# Patient Record
Sex: MALE | Race: WHITE | NOT HISPANIC OR LATINO | Employment: OTHER | URBAN - METROPOLITAN AREA
[De-identification: names, ages, dates, MRNs, and addresses within clinical notes are randomized per-mention and may not be internally consistent; named-entity substitution may affect disease eponyms.]

---

## 2022-03-07 ENCOUNTER — TELEPHONE (OUTPATIENT)
Dept: UROLOGY | Facility: CLINIC | Age: 82
End: 2022-03-07
Payer: MEDICARE

## 2022-03-07 NOTE — TELEPHONE ENCOUNTER
Called and spoke with pt, pt wanting to know why his appointment was cancelled, informed pt that his appointment was cancelled due to Dr Jha being out on vacation, pt states Michael Swain was suppose to send over a referral to Dr Sousa office which we never received, pt states he is coming for a yearly check up but could not tell me for what. Pt requesting we send for his records, informed pt that we will need him to sign a release of information first, pt upset that we can just call and request his records. Advised pt that he is more than welcome to call and have Michael Swain's office fax them over or he can go pick them up himself if he would like to. Pt scheduled for 3/22/22 at 10:00am gianni Quintana.

## 2022-03-07 NOTE — TELEPHONE ENCOUNTER
----- Message from Mima Duff sent at 3/7/2022  7:10 AM CST -----  Contact: Pt Mobile [phone]  Patient would like a call back in regards to him wanting to know why his appointment with you on today was cancelled please?

## 2022-03-22 ENCOUNTER — OFFICE VISIT (OUTPATIENT)
Dept: UROLOGY | Facility: CLINIC | Age: 82
End: 2022-03-22
Payer: MEDICARE

## 2022-03-22 VITALS
DIASTOLIC BLOOD PRESSURE: 80 MMHG | SYSTOLIC BLOOD PRESSURE: 130 MMHG | WEIGHT: 190.06 LBS | BODY MASS INDEX: 27.27 KG/M2 | HEART RATE: 67 BPM

## 2022-03-22 DIAGNOSIS — R35.1 BPH ASSOCIATED WITH NOCTURIA: Primary | ICD-10-CM

## 2022-03-22 DIAGNOSIS — N40.1 BPH ASSOCIATED WITH NOCTURIA: Primary | ICD-10-CM

## 2022-03-22 PROCEDURE — 99999 PR PBB SHADOW E&M-EST. PATIENT-LVL III: ICD-10-PCS | Mod: PBBFAC,,, | Performed by: UROLOGY

## 2022-03-22 PROCEDURE — 99213 OFFICE O/P EST LOW 20 MIN: CPT | Mod: PBBFAC,PO | Performed by: UROLOGY

## 2022-03-22 PROCEDURE — 99203 OFFICE O/P NEW LOW 30 MIN: CPT | Mod: S$PBB,,, | Performed by: UROLOGY

## 2022-03-22 PROCEDURE — 99999 PR PBB SHADOW E&M-EST. PATIENT-LVL III: CPT | Mod: PBBFAC,,, | Performed by: UROLOGY

## 2022-03-22 PROCEDURE — 99203 PR OFFICE/OUTPT VISIT, NEW, LEVL III, 30-44 MIN: ICD-10-PCS | Mod: S$PBB,,, | Performed by: UROLOGY

## 2022-03-22 RX ORDER — DOFETILIDE 0.5 MG/1
125 CAPSULE ORAL EVERY 12 HOURS
COMMUNITY

## 2022-03-22 RX ORDER — ASPIRIN 81 MG/1
81 TABLET ORAL
COMMUNITY

## 2022-03-22 RX ORDER — ACETAMINOPHEN 500 MG
1 TABLET ORAL DAILY
COMMUNITY

## 2022-03-22 RX ORDER — ATORVASTATIN CALCIUM 20 MG/1
20 TABLET, FILM COATED ORAL DAILY
COMMUNITY

## 2022-03-22 NOTE — PROGRESS NOTES
Chief Complaint   Patient presents with    Other     Frequency during nigh time. Pt saw previous urologist. Pt psa from jan was 1.8        Referring Provider: Arianna Swain     History of Present Illness:   Corey Holland is a 81 y.o. male here for evaluation of Other (Frequency during nigh time. Pt saw previous urologist. Pt psa from jan was 1.8 )    3/22/22- 80yo male referred for BPH checkup. Nocturia x 2. Not interested in medication. He previously saw Dr. El and trace msh was seen once, but didn't require workup at that time. Cytology was negative. Records reviewed. Stream is good. No gross hematuria or dysuria.   Pt is a cyclist. Biked 80 miles 10 days in a row for his 81th birthday.         Review of Systems   Constitutional: Negative for chills and fever.   Respiratory: Negative for shortness of breath.    Cardiovascular: Negative for chest pain.   Gastrointestinal: Negative for abdominal pain.   Genitourinary: Positive for nocturia.   All other systems reviewed and are negative.      Past Medical History:   Diagnosis Date    Arthritis     Essential (primary) hypertension     Hypothyroidism, unspecified     Mixed hyperlipidemia     Paroxysmal atrial fibrillation        Past Surgical History:   Procedure Laterality Date    ANKLE SURGERY         No family history on file.    Social History     Tobacco Use    Smoking status: Never Smoker   Substance Use Topics    Alcohol use: Yes     Alcohol/week: 6.0 standard drinks     Types: 6 Cans of beer per week       Current Outpatient Medications   Medication Sig Dispense Refill    atorvastatin (LIPITOR) 20 MG tablet Take 20 mg by mouth once daily.      betahistine HCl (BETAHISTINE, BULK, MISC) 24 mg by Misc.(Non-Drug; Combo Route) route.      cholecalciferol, vitamin D3, (VITAMIN D3) 50 mcg (2,000 unit) Cap Take 1 capsule by mouth once daily.      dofetilide (TIKOSYN) 500 MCG Cap Take 125 mcg by mouth every 12 (twelve) hours.      levothyroxine  (SYNTHROID) 137 MCG Tab Take by mouth before breakfast.      meloxicam (MOBIC) 15 MG tablet Take 15 mg by mouth daily as needed.      aspirin (ECOTRIN) 81 MG EC tablet Take 81 mg by mouth.       No current facility-administered medications for this visit.       Review of patient's allergies indicates:   Allergen Reactions    Penicillins        Physical Exam  Vitals:    03/22/22 1113   BP: 130/80   Pulse: 67       General: Well-developed, well-nourished in no acute distress  HEENT: Normocephalic, atraumatic, Extraocular movements intact  Neck: supple, trachea midline, no cervical or supraclavicular lymphadenopathy  Respirations: even and unlabored  Back: midline spine, no CVA tenderness  Rectal: 40g prostate, no nodules or tenderness. No gross blood  Extremities: atraumatic, moves all equally, no clubbing, cyanosis or edema  Psych: normal affect  Skin: warm and dry, no lesions  Neuro: Alert and oriented, Cranial nerves II-XII grossly intact      PSA  1/25/22- 1.8    Assessment:   1. BPH associated with nocturia  POCT URINE DIPSTICK WITHOUT MICROSCOPE       Plan:  BPH associated with nocturia  -     POCT URINE DIPSTICK WITHOUT MICROSCOPE        Follow up in about 1 year (around 3/22/2023).

## 2022-03-30 PROBLEM — R35.1 BPH ASSOCIATED WITH NOCTURIA: Status: ACTIVE | Noted: 2022-03-30

## 2022-03-30 PROBLEM — N40.1 BPH ASSOCIATED WITH NOCTURIA: Status: ACTIVE | Noted: 2022-03-30

## 2024-07-08 ENCOUNTER — OFFICE VISIT (OUTPATIENT)
Dept: UROLOGY | Facility: CLINIC | Age: 84
End: 2024-07-08
Payer: MEDICARE

## 2024-07-08 VITALS
SYSTOLIC BLOOD PRESSURE: 139 MMHG | HEIGHT: 70 IN | DIASTOLIC BLOOD PRESSURE: 77 MMHG | RESPIRATION RATE: 18 BRPM | WEIGHT: 186.06 LBS | BODY MASS INDEX: 26.64 KG/M2 | HEART RATE: 45 BPM

## 2024-07-08 DIAGNOSIS — N13.8 BPH WITH OBSTRUCTION/LOWER URINARY TRACT SYMPTOMS: Primary | ICD-10-CM

## 2024-07-08 DIAGNOSIS — R35.1 NOCTURIA: ICD-10-CM

## 2024-07-08 DIAGNOSIS — I48.0 PAROXYSMAL ATRIAL FIBRILLATION: ICD-10-CM

## 2024-07-08 DIAGNOSIS — N40.1 BPH WITH OBSTRUCTION/LOWER URINARY TRACT SYMPTOMS: Primary | ICD-10-CM

## 2024-07-08 PROCEDURE — 99214 OFFICE O/P EST MOD 30 MIN: CPT | Mod: S$GLB,,, | Performed by: UROLOGY

## 2024-07-08 PROCEDURE — 1101F PT FALLS ASSESS-DOCD LE1/YR: CPT | Mod: CPTII,S$GLB,, | Performed by: UROLOGY

## 2024-07-08 PROCEDURE — 3075F SYST BP GE 130 - 139MM HG: CPT | Mod: CPTII,S$GLB,, | Performed by: UROLOGY

## 2024-07-08 PROCEDURE — 99999 PR PBB SHADOW E&M-EST. PATIENT-LVL III: CPT | Mod: PBBFAC,,, | Performed by: UROLOGY

## 2024-07-08 PROCEDURE — 1126F AMNT PAIN NOTED NONE PRSNT: CPT | Mod: CPTII,S$GLB,, | Performed by: UROLOGY

## 2024-07-08 PROCEDURE — 1159F MED LIST DOCD IN RCRD: CPT | Mod: CPTII,S$GLB,, | Performed by: UROLOGY

## 2024-07-08 PROCEDURE — 3288F FALL RISK ASSESSMENT DOCD: CPT | Mod: CPTII,S$GLB,, | Performed by: UROLOGY

## 2024-07-08 PROCEDURE — 3078F DIAST BP <80 MM HG: CPT | Mod: CPTII,S$GLB,, | Performed by: UROLOGY

## 2024-07-08 RX ORDER — TAMSULOSIN HYDROCHLORIDE 0.4 MG/1
0.4 CAPSULE ORAL NIGHTLY
Qty: 30 CAPSULE | Refills: 11 | Status: SHIPPED | OUTPATIENT
Start: 2024-07-08 | End: 2025-07-08

## 2024-07-08 RX ORDER — LOSARTAN POTASSIUM 25 MG/1
25 TABLET ORAL
COMMUNITY

## 2024-07-08 RX ORDER — RIVAROXABAN 20 MG/1
TABLET, FILM COATED ORAL
COMMUNITY
Start: 2024-06-12

## 2024-07-08 RX ORDER — CLOBETASOL PROPIONATE 0.5 MG/G
CREAM TOPICAL
COMMUNITY
Start: 2024-05-03 | End: 2024-10-30

## 2024-07-08 NOTE — PROGRESS NOTES
CC: BPH    History of Present Illness:   Corey Holland is a 83 y.o. male here for follow up.     7/8/24-Pt developed A-fib and is now on Xarelto. Pt continues to cycle. LUTS are becoming more bothersome. He is having nocturia x 3. Denies weak stream. He also has some daytime frequency. No gross hematuria.   3/22/22- 80yo male referred for BPH checkup. Nocturia x 2. Not interested in medication. He previously saw Dr. El and trace msh was seen once, but didn't require workup at that time. Cytology was negative. Records reviewed. Stream is good. No gross hematuria or dysuria.   Pt is a cyclist. Biked 80 miles 10 days in a row for his 81th birthday.         Review of Systems   Constitutional:  Negative for chills and fever.   Respiratory:  Negative for shortness of breath.    Cardiovascular:  Negative for chest pain.   Gastrointestinal:  Negative for abdominal pain.   Genitourinary:  Positive for nocturia.   All other systems reviewed and are negative.      Past Medical History:   Diagnosis Date    Arthritis     Essential (primary) hypertension     Hypothyroidism, unspecified     Mixed hyperlipidemia     Paroxysmal atrial fibrillation        Past Surgical History:   Procedure Laterality Date    ANKLE SURGERY         No family history on file.    Social History     Tobacco Use    Smoking status: Never   Substance Use Topics    Alcohol use: Yes     Alcohol/week: 6.0 standard drinks of alcohol     Types: 6 Cans of beer per week       Current Outpatient Medications   Medication Sig Dispense Refill    aspirin (ECOTRIN) 81 MG EC tablet Take 81 mg by mouth.      atorvastatin (LIPITOR) 20 MG tablet Take 20 mg by mouth once daily.      betahistine HCl (BETAHISTINE, BULK, MISC) 24 mg by Misc.(Non-Drug; Combo Route) route.      cholecalciferol, vitamin D3, (VITAMIN D3) 50 mcg (2,000 unit) Cap Take 1 capsule by mouth once daily.      clobetasoL (TEMOVATE) 0.05 % cream Apply topically.      dofetilide (TIKOSYN) 500 MCG Cap Take  125 mcg by mouth every 12 (twelve) hours.      levothyroxine (SYNTHROID) 137 MCG Tab Take by mouth before breakfast.      meloxicam (MOBIC) 15 MG tablet Take 15 mg by mouth daily as needed.      XARELTO 20 mg Tab TAKE 1 TABLET BY MOUTH EVERY DAY WITH THE EVENING MEAL      losartan (COZAAR) 25 MG tablet Take 25 mg by mouth.      tamsulosin (FLOMAX) 0.4 mg Cap Take 1 capsule (0.4 mg total) by mouth every evening. 30 capsule 11     No current facility-administered medications for this visit.       Review of patient's allergies indicates:   Allergen Reactions    Penicillins        Physical Exam  Vitals:    07/08/24 1217   BP: 139/77   Pulse: (!) 45   Resp: 18       General: Well-developed, well-nourished in no acute distress  HEENT: Normocephalic, atraumatic, Extraocular movements intact  Neck: supple, trachea midline, no cervical or supraclavicular lymphadenopathy  Respirations: even and unlabored  Back: midline spine, no CVA tenderness  Rectal: 7/8/24-40g prostate, no nodules or tenderness. No gross blood  Extremities: atraumatic, moves all equally, no clubbing, cyanosis or edema  Psych: normal affect  Skin: warm and dry, no lesions  Neuro: Alert and oriented, Cranial nerves II-XII grossly intact      PSA  1/25/22- 1.8  5/3/24: 2.4    Assessment:   1. BPH with obstruction/lower urinary tract symptoms        2. Nocturia        3. Paroxysmal atrial fibrillation              Plan:  BPH with obstruction/lower urinary tract symptoms    Nocturia    Paroxysmal atrial fibrillation    Other orders  -     tamsulosin (FLOMAX) 0.4 mg Cap; Take 1 capsule (0.4 mg total) by mouth every evening.  Dispense: 30 capsule; Refill: 11      Limit pm fluid    Follow up in about 3 months (around 10/8/2024).

## 2024-10-07 ENCOUNTER — OFFICE VISIT (OUTPATIENT)
Dept: UROLOGY | Facility: CLINIC | Age: 84
End: 2024-10-07
Payer: MEDICARE

## 2024-10-07 VITALS
HEART RATE: 52 BPM | TEMPERATURE: 98 F | BODY MASS INDEX: 27.08 KG/M2 | HEIGHT: 70 IN | WEIGHT: 189.13 LBS | RESPIRATION RATE: 17 BRPM | SYSTOLIC BLOOD PRESSURE: 136 MMHG | DIASTOLIC BLOOD PRESSURE: 84 MMHG

## 2024-10-07 DIAGNOSIS — N40.1 BPH WITH OBSTRUCTION/LOWER URINARY TRACT SYMPTOMS: Primary | ICD-10-CM

## 2024-10-07 DIAGNOSIS — R31.29 MICROHEMATURIA: ICD-10-CM

## 2024-10-07 DIAGNOSIS — R35.1 NOCTURIA: ICD-10-CM

## 2024-10-07 DIAGNOSIS — N13.8 BPH WITH OBSTRUCTION/LOWER URINARY TRACT SYMPTOMS: Primary | ICD-10-CM

## 2024-10-07 LAB
BACTERIA #/AREA URNS HPF: NORMAL /HPF
BILIRUB UR QL STRIP: NEGATIVE
GLUCOSE UR QL STRIP: NEGATIVE
KETONES UR QL STRIP: NEGATIVE
LEUKOCYTE ESTERASE UR QL STRIP: NEGATIVE
MICROSCOPIC COMMENT: NORMAL
PH, POC UA: 5.5
POC BLOOD, URINE: POSITIVE
POC NITRATES, URINE: NEGATIVE
PROT UR QL STRIP: NEGATIVE
RBC #/AREA URNS HPF: 2 /HPF (ref 0–4)
SP GR UR STRIP: 1.03 (ref 1–1.03)
UROBILINOGEN UR STRIP-ACNC: 0.2 (ref 0.3–2.2)
WBC #/AREA URNS HPF: 2 /HPF (ref 0–5)

## 2024-10-07 PROCEDURE — 1126F AMNT PAIN NOTED NONE PRSNT: CPT | Mod: CPTII,S$GLB,, | Performed by: UROLOGY

## 2024-10-07 PROCEDURE — 81000 URINALYSIS NONAUTO W/SCOPE: CPT | Performed by: UROLOGY

## 2024-10-07 PROCEDURE — 99999 PR PBB SHADOW E&M-EST. PATIENT-LVL IV: CPT | Mod: PBBFAC,,, | Performed by: UROLOGY

## 2024-10-07 PROCEDURE — 99214 OFFICE O/P EST MOD 30 MIN: CPT | Mod: S$GLB,,, | Performed by: UROLOGY

## 2024-10-07 PROCEDURE — 1159F MED LIST DOCD IN RCRD: CPT | Mod: CPTII,S$GLB,, | Performed by: UROLOGY

## 2024-10-07 PROCEDURE — 81003 URINALYSIS AUTO W/O SCOPE: CPT | Mod: QW,S$GLB,, | Performed by: UROLOGY

## 2024-10-07 PROCEDURE — 3079F DIAST BP 80-89 MM HG: CPT | Mod: CPTII,S$GLB,, | Performed by: UROLOGY

## 2024-10-07 PROCEDURE — 1101F PT FALLS ASSESS-DOCD LE1/YR: CPT | Mod: CPTII,S$GLB,, | Performed by: UROLOGY

## 2024-10-07 PROCEDURE — 3075F SYST BP GE 130 - 139MM HG: CPT | Mod: CPTII,S$GLB,, | Performed by: UROLOGY

## 2024-10-07 PROCEDURE — 3288F FALL RISK ASSESSMENT DOCD: CPT | Mod: CPTII,S$GLB,, | Performed by: UROLOGY

## 2024-10-07 NOTE — PROGRESS NOTES
CC: BPH    History of Present Illness:   Corey Holland is a 84 y.o. male here for follow up.     10/7/24-didn't notice a dramatic difference in his urination with the flomax. He has had improvement with stream and emptying better. Nocturia x 2-3. IPSS 6.   7/8/24-Pt developed A-fib and is now on Xarelto. Pt continues to cycle. LUTS are becoming more bothersome. He is having nocturia x 3. Denies weak stream. He also has some daytime frequency. No gross hematuria.   3/22/22- 80yo male referred for BPH checkup. Nocturia x 2. Not interested in medication. He previously saw Dr. El and trace msh was seen once, but didn't require workup at that time. Cytology was negative. Records reviewed. Stream is good. No gross hematuria or dysuria.   Pt is a cyclist. Biked 80 miles 10 days in a row for his 81th birthday.         Review of Systems   Constitutional:  Negative for chills and fever.   Respiratory:  Negative for shortness of breath.    Cardiovascular:  Negative for chest pain.   Gastrointestinal:  Negative for abdominal pain.   Genitourinary:  Positive for nocturia.   All other systems reviewed and are negative.      Past Medical History:   Diagnosis Date    Arthritis     Essential (primary) hypertension     Hypothyroidism, unspecified     Mixed hyperlipidemia     Paroxysmal atrial fibrillation        Past Surgical History:   Procedure Laterality Date    ANKLE SURGERY         No family history on file.    Social History     Tobacco Use    Smoking status: Never   Substance Use Topics    Alcohol use: Yes     Alcohol/week: 6.0 standard drinks of alcohol     Types: 6 Cans of beer per week       Current Outpatient Medications   Medication Sig Dispense Refill    aspirin (ECOTRIN) 81 MG EC tablet Take 81 mg by mouth.      atorvastatin (LIPITOR) 20 MG tablet Take 20 mg by mouth once daily.      betahistine HCl (BETAHISTINE, BULK, MISC) 24 mg by Misc.(Non-Drug; Combo Route) route.      cholecalciferol, vitamin D3, (VITAMIN D3)  50 mcg (2,000 unit) Cap Take 1 capsule by mouth once daily.      clobetasoL (TEMOVATE) 0.05 % cream Apply topically.      dofetilide (TIKOSYN) 500 MCG Cap Take 125 mcg by mouth every 12 (twelve) hours.      levothyroxine (SYNTHROID) 137 MCG Tab Take by mouth before breakfast.      losartan (COZAAR) 25 MG tablet Take 25 mg by mouth. (Patient not taking: Reported on 10/7/2024)      meloxicam (MOBIC) 15 MG tablet Take 15 mg by mouth daily as needed.      tamsulosin (FLOMAX) 0.4 mg Cap Take 1 capsule (0.4 mg total) by mouth every evening. 30 capsule 11    XARELTO 20 mg Tab TAKE 1 TABLET BY MOUTH EVERY DAY WITH THE EVENING MEAL       No current facility-administered medications for this visit.       Review of patient's allergies indicates:   Allergen Reactions    Penicillins        Physical Exam  Vitals:    10/07/24 1221   BP: 136/84   Pulse: (!) 52   Resp: 17   Temp: 97.5 °F (36.4 °C)       General: Well-developed, well-nourished in no acute distress  HEENT: Normocephalic, atraumatic, Extraocular movements intact  Neck: supple, trachea midline, no cervical or supraclavicular lymphadenopathy  Respirations: even and unlabored  Back: midline spine, no CVA tenderness  Rectal: 7/8/24-40g prostate, no nodules or tenderness. No gross blood  Extremities: atraumatic, moves all equally, no clubbing, cyanosis or edema  Psych: normal affect  Skin: warm and dry, no lesions  Neuro: Alert and oriented, Cranial nerves II-XII grossly intact      PSA  1/25/22- 1.8  5/3/24: 2.4    Assessment:   1. BPH with obstruction/lower urinary tract symptoms  POCT Urinalysis, Dipstick, Automated, W/O Scope            Plan:  BPH with obstruction/lower urinary tract symptoms  -     POCT Urinalysis, Dipstick, Automated, W/O Scope      Limit pm fluid  Continue flomax  Follow up in about 9 months (around 7/7/2025).                       fluid  Continue flomax  Follow up in about 9 months (around 7/7/2025).

## 2025-04-22 ENCOUNTER — TELEPHONE (OUTPATIENT)
Dept: UROLOGY | Facility: CLINIC | Age: 85
End: 2025-04-22
Payer: MEDICARE

## 2025-04-22 NOTE — TELEPHONE ENCOUNTER
Outgoing call placed to patient, patient verified name and date of birth, patient notified of Dr. Jha being out of office on his next scheduled visit and the need to reschedule, he verbalized understanding and appt rescheduled as requested.

## 2025-04-22 NOTE — TELEPHONE ENCOUNTER
Outgoing call attempted to notify patient regarding his upcoming appointment but no answer, left voice message with contact information letting patient know he can contact us at his earliest convenience for assistance with rescheduling.

## 2025-05-09 ENCOUNTER — TELEPHONE (OUTPATIENT)
Dept: UROLOGY | Facility: CLINIC | Age: 85
End: 2025-05-09
Payer: MEDICARE

## 2025-05-16 ENCOUNTER — PATIENT MESSAGE (OUTPATIENT)
Dept: UROLOGY | Facility: CLINIC | Age: 85
End: 2025-05-16

## 2025-05-16 ENCOUNTER — PROCEDURE VISIT (OUTPATIENT)
Dept: UROLOGY | Facility: CLINIC | Age: 85
End: 2025-05-16
Payer: MEDICARE

## 2025-05-16 DIAGNOSIS — N40.2 PROSTATE NODULE: ICD-10-CM

## 2025-05-16 DIAGNOSIS — D49.4 BLADDER TUMOR: Primary | ICD-10-CM

## 2025-05-16 DIAGNOSIS — R31.0 GROSS HEMATURIA: ICD-10-CM

## 2025-05-16 DIAGNOSIS — N39.0 URINARY TRACT INFECTION WITHOUT HEMATURIA, SITE UNSPECIFIED: ICD-10-CM

## 2025-05-16 DIAGNOSIS — Z01.818 PRE-OP TESTING: ICD-10-CM

## 2025-05-16 RX ORDER — CIPROFLOXACIN 2 MG/ML
400 INJECTION, SOLUTION INTRAVENOUS
OUTPATIENT
Start: 2025-05-16

## 2025-05-16 RX ORDER — LIDOCAINE HYDROCHLORIDE 20 MG/ML
JELLY TOPICAL
Status: COMPLETED | OUTPATIENT
Start: 2025-05-16 | End: 2025-05-16

## 2025-05-16 RX ADMIN — LIDOCAINE HYDROCHLORIDE 11 ML: 20 JELLY TOPICAL at 10:05
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
## 2025-05-19 ENCOUNTER — HOSPITAL ENCOUNTER (OUTPATIENT)
Dept: CARDIOLOGY | Facility: HOSPITAL | Age: 85
Discharge: HOME | End: 2025-05-19
Attending: UROLOGY
Payer: MEDICARE

## 2025-05-19 ENCOUNTER — HOSPITAL ENCOUNTER (OUTPATIENT)
Dept: RADIOLOGY | Facility: HOSPITAL | Age: 85
Discharge: HOME | End: 2025-05-19
Attending: UROLOGY
Payer: MEDICARE

## 2025-05-19 DIAGNOSIS — N40.2 PROSTATE NODULE: ICD-10-CM

## 2025-05-19 LAB
OHS QRS DURATION: 100 MS
OHS QTC CALCULATION: 355 MS

## 2025-05-19 PROCEDURE — 71046 X-RAY EXAM CHEST 2 VIEWS: CPT | Mod: 26,,, | Performed by: RADIOLOGY

## 2025-05-19 PROCEDURE — 71046 X-RAY EXAM CHEST 2 VIEWS: CPT | Mod: TC

## 2025-05-19 PROCEDURE — 93005 ELECTROCARDIOGRAM TRACING: CPT

## 2025-05-19 PROCEDURE — 93010 ELECTROCARDIOGRAM REPORT: CPT | Mod: ,,, | Performed by: INTERNAL MEDICINE

## 2025-05-19 RX ORDER — TAMSULOSIN HYDROCHLORIDE 0.4 MG/1
0.4 CAPSULE ORAL
Qty: 30 CAPSULE | Refills: 11 | Status: SHIPPED | OUTPATIENT
Start: 2025-05-19

## 2025-05-23 ENCOUNTER — PATIENT MESSAGE (OUTPATIENT)
Dept: UROLOGY | Facility: CLINIC | Age: 85
End: 2025-05-23
Payer: MEDICARE

## 2025-05-26 ENCOUNTER — RESULTS FOLLOW-UP (OUTPATIENT)
Dept: UROLOGY | Facility: CLINIC | Age: 85
End: 2025-05-26

## 2025-05-26 ENCOUNTER — HOSPITAL ENCOUNTER (OUTPATIENT)
Dept: RADIOLOGY | Facility: HOSPITAL | Age: 85
Discharge: HOME | End: 2025-05-26
Attending: UROLOGY
Payer: MEDICARE

## 2025-05-26 DIAGNOSIS — N40.2 PROSTATE NODULE: ICD-10-CM

## 2025-05-26 PROCEDURE — 72197 MRI PELVIS W/O & W/DYE: CPT | Mod: 26,,, | Performed by: RADIOLOGY

## 2025-05-26 PROCEDURE — 72197 MRI PELVIS W/O & W/DYE: CPT | Mod: TC,PN

## 2025-05-26 PROCEDURE — 25500020 PHARM REV CODE 255: Mod: PN | Performed by: UROLOGY

## 2025-05-26 PROCEDURE — A9585 GADOBUTROL INJECTION: HCPCS | Mod: PN | Performed by: UROLOGY

## 2025-05-26 RX ORDER — GADOBUTROL 604.72 MG/ML
8 INJECTION INTRAVENOUS
Status: COMPLETED | OUTPATIENT
Start: 2025-05-26 | End: 2025-05-26

## 2025-05-26 RX ADMIN — GADOBUTROL 8 ML: 604.72 INJECTION INTRAVENOUS at 10:05

## 2025-05-27 ENCOUNTER — OFFICE VISIT (OUTPATIENT)
Dept: INTERNAL MEDICINE | Facility: CLINIC | Age: 85
End: 2025-05-27
Payer: MEDICARE

## 2025-05-27 VITALS
HEART RATE: 73 BPM | TEMPERATURE: 99 F | RESPIRATION RATE: 20 BRPM | OXYGEN SATURATION: 97 % | DIASTOLIC BLOOD PRESSURE: 80 MMHG | SYSTOLIC BLOOD PRESSURE: 130 MMHG

## 2025-05-27 DIAGNOSIS — Z01.818 PREOPERATIVE EXAMINATION: Primary | ICD-10-CM

## 2025-05-27 DIAGNOSIS — I48.0 PAF (PAROXYSMAL ATRIAL FIBRILLATION): ICD-10-CM

## 2025-05-27 DIAGNOSIS — N40.2 PROSTATE NODULE: ICD-10-CM

## 2025-05-27 PROCEDURE — 99999 PR PBB SHADOW E&M-EST. PATIENT-LVL III: CPT | Mod: PBBFAC,,,

## 2025-05-27 NOTE — PROGRESS NOTES
Preoperative History and Physical                                                             Hospital Medicine      Chief Complaint: Preoperative consultation    History of Present Illness:      Corey Lino is a 84 y.o. male who  has a past medical history of Arthritis, Essential (primary) hypertension, Hypothyroidism, unspecified, Mixed hyperlipidemia, BPH, and Paroxysmal atrial fibrillation s/p ablation who presents to the office today for a preoperative consultation at the request of Dr. Meghan Jha who plans on performing TURBT on May 29, 2025.    Functional Status:      The patient is able to climb a flight of stairs. The patient is able to ambulate without difficulty. The patient's functional status is not affected by their joint pain. The patient's functional status is not affected by shortness of breath, chest pain, dyspnea on exertion and fatigue.      Past Medical History:      Past Medical History:   Diagnosis Date    Arthritis     Essential (primary) hypertension     Hypothyroidism, unspecified     Mixed hyperlipidemia     Paroxysmal atrial fibrillation         Past Surgical History:      Past Surgical History:   Procedure Laterality Date    ANKLE SURGERY      cardiac loop monitor          Social History:      Social History     Socioeconomic History    Marital status:    Tobacco Use    Smoking status: Never   Substance and Sexual Activity    Alcohol use: Yes     Alcohol/week: 6.0 standard drinks of alcohol     Types: 6 Cans of beer per week     Social Drivers of Health     Financial Resource Strain: Low Risk  (5/11/2025)    Overall Financial Resource Strain (CARDIA)     Difficulty of Paying Living Expenses: Not hard at all   Food Insecurity: No Food Insecurity (5/11/2025)    Hunger Vital Sign     Worried About Running Out of Food in the Last Year: Never true     Ran Out of Food in the Last Year: Never true   Transportation Needs: No Transportation Needs (5/11/2025)    PRAPARE -  Transportation     Lack of Transportation (Medical): No     Lack of Transportation (Non-Medical): No   Physical Activity: Sufficiently Active (5/11/2025)    Exercise Vital Sign     Days of Exercise per Week: 5 days     Minutes of Exercise per Session: 150+ min   Stress: No Stress Concern Present (5/11/2025)    [employer] [place] [employer] - Occupational Stress Questionnaire     Feeling of Stress : Not at all   Housing Stability: Low Risk  (5/11/2025)    Housing Stability Vital Sign     Unable to Pay for Housing in the Last Year: No     Number of Times Moved in the Last Year: 0     Homeless in the Last Year: No        Family History:      No family history on file.    Allergies:      Review of patient's allergies indicates:   Allergen Reactions    Penicillins        Medications:      No current facility-administered medications for this visit.     Current Outpatient Medications   Medication Sig    hyoscyamine 0.125 mg Subl Place 2 tablets (0.25 mg total) under the tongue every 4 (four) hours as needed (bladder spasms).     Facility-Administered Medications Ordered in Other Visits   Medication    HYDROmorphone (PF) injection 0.2 mg    ketorolac injection 15 mg    ondansetron injection 4 mg    oxyCODONE-acetaminophen 5-325 mg per tablet 1 tablet       Vitals:      BP:  130/80  HR:  73  RR:  18  Temp:  98.6  SpO2:  97% room air    Review of Systems:        Constitutional: Negative for fever, chills, weight loss, malaise/fatigue and diaphoresis.   HENT: Negative for hearing loss, ear pain, nosebleeds, congestion, sore throat, neck pain, tinnitus and ear discharge.    Eyes: Negative for blurred vision, double vision, photophobia, pain, discharge and redness.   Respiratory: Negative for cough, hemoptysis, sputum production, shortness of breath, wheezing and stridor.    Cardiovascular: Negative for chest pain, palpitations, orthopnea, claudication, leg swelling and PND.   Gastrointestinal: Negative for  heartburn, nausea, vomiting, abdominal pain, diarrhea, constipation, blood in stool and melena.   Genitourinary: Negative for dysuria, urgency, frequency, hematuria and flank pain.   Musculoskeletal: Negative for myalgias, back pain, joint pain and falls.   Skin: Negative for itching and rash.   Neurological: Negative for dizziness, tingling, tremors, sensory change, speech change, focal weakness, seizures, loss of consciousness, weakness and headaches.   Endo/Heme/Allergies: Negative for environmental allergies and polydipsia. Does not bruise/bleed easily.   Psychiatric/Behavioral: Negative for depression, suicidal ideas, hallucinations, memory loss and substance abuse. The patient is not nervous/anxious and does not have insomnia.    All 14 systems reviewed and negative except as noted above.    Physical Exam:      Constitutional: Appears well-developed, well-nourished and in no acute distress.  Pt is oriented to person, place, and time.   Head: Normocephalic and atraumatic. Mucous membranes moist.  Neck: Neck supple no mass.   Cardiovascular: Normal rate and regular rhythm.  S1 S2 appreciated by ascultation.  Pulmonary/Chest: Effort normal and clear to auscultation bilaterally. No respiratory distress.   Abdomen: Soft. Non-tender and non-distended. Bowel sounds are normal.   Neurological: Pt is alert and oriented to person, place, and time. Moves all extremities.  Skin: Warm and dry. No lesions.  Extremities: No clubbing cyanosis or edema.    Laboratory data:      Reviewed and noted in plan where applicable. Please see chart for full laboratory data.      Lab Results   Component Value Date    INR 2.4 05/08/2025   CBC W/ AUTO DIFFERENTIAL  Order: [id number]   Ref Range & Units 2 wk ago   White Blood Cell Count 4.0 - 11.0 1000/uL 7   Red Blood Cell Count 4.50 - 5.60 mill/uL 4.68   Hemoglobin 14.0 - 18.0 g/dL 14.8   Hematocrit 42.0 - 52.0 % 46.1   Mean Corpuscular Volume 80 - 100 fL 99   Mean Corpuscular Hemoglobin  Conc 31.0 - 37.0 g/dL 32.1   RDW 12.1 - 14.9 % 12.9   Platelets 150 - 375 K/uL 168   MPV 6.5 - 12.0 fL 11   nRBC 0.0 - 0.0 /100 WBCs 0   nRBC, Absolute <=0.11 1000/ul <0.01   Neutrophils Relative 44 - 81 % 72   Lymphocytes % 21 - 47 % 15 Low    Monocytes % 2 - 11 % 10   Eosinophils % 0 - 7 % 2   Baso, CSF 0 - 1 % 0   Immature Granulocytes % 0.0 - 0.6 % 0.4   Neutrophils, Abs 1.5 - 10.0 1000/UL 5.1   Lymphocytes Absolute 1.3 - 2.9 1000/ul 1.1 Low    Monocytes Absolute Count 0.1 - 1.0 1000/ul 0.7   Eosinophils, Absolute 0.0 - 0.7 1000/UL 0.1   Basophils, Absolute 0.0 - 0.1 1000/UL 0   Immature Grans (Abs) 0.00 - 0.09 1000/ul 0.03   Resulting Agency  [facility]     Contains abnormal data COMPREHENSIVE METABOLIC PANEL  Order: [id number]   Ref Range & Units 3 wk ago   Creatinine 0.73 - 1.18 mg/dL 1.05   Blood Urea Nitrogen 5 - 25 mg/dL 22   Sodium 136 - 145 mmol/L 143   Potassium 3.5 - 5.1 mmol/L 4.5   Chloride 100 - 109 mmol/L 105   Carbon Dioxide 22 - 33 mmol/L 26   Glucose Screen 70 - 100 mg/dL 104 High    Calcium 8.8 - 10.6 mg/dL 8.6 Low    Protein Total 6.0 - 8.3 g/dL 6.9   Albumin Level 3.5 - 5.0 g/dl 3.7   Bilirubin Total 0.2 - 1.2 mg/dL 0.6   Alkaline Phosphatase Level 40 - 150 U/L 73   AST 10 - 58 U/L 15   ALT <=50 U/L 13   Anion Gap 5 - 13 mmol/L 12   eGFR African American mL/min/1.73mSq 70          Predictors of intubation difficulty:       Morbid obesity? no   Anatomically abnormal facies? no   Prominent incisors? no   Receding mandible? no   Short, thick neck? no   Neck range of motion: normal   Dentition: No chipped, loose, or missing teeth.    Cardiographics:      ECG (dated 5/16/25):   Vent. Rate :  40 BPM     Atrial Rate :  40 BPM      P-R Int : 192 ms          QRS Dur : 100 ms       QT Int : 436 ms       P-R-T Axes :  91  17  44 degrees     QTcB Int : 355 ms     Marked sinus bradycardia   Abnormal ECG   No previous ECGs available   Confirmed by Howie Lu ([id number]) on 5/19/2025 10:59:09 PM        Echocardiogram: not done    Imaging:      Chest x-ray (dated 5/16/25):   EXAMINATION:  XR CHEST PA AND LATERAL PRE-OP     CLINICAL HISTORY:  Nodular prostate without lower urinary tract symptoms     TECHNIQUE:  PA and lateral views of the chest were performed.     COMPARISON:  None     FINDINGS:  The lungs are clear and free of infiltrate.  No pleural effusion or pneumothorax. The heart is not enlarged. A loop recorder is noted.  There is mild tortuosity of the descending thoracic aorta.     Impression:     1.  No acute cardiopulmonary process.      Assessment:      84 y.o. male with planned surgery as above.    Known risk factors for perioperative complications: A-fib    Difficulty with intubation is not anticipated.      Plan:      Preoperative examination  Assessment & Plan:  Known risk factors for perioperative complications: PAF    Difficulty with intubation is not anticipated.    Cardiac Risk Estimation:     Revised Cardiac Risk Index for Pre-Operative Risk from [employer]  on 5/29/2025  ** All calculations should be rechecked by clinician prior to use **    RESULT SUMMARY:  0 points  RCRI Score    3.9 %  Risk of major cardiac event      INPUTS:  High-risk surgery --> 0 = No  History of ischemic heart disease --> 0 = No  History of congestive heart failure --> 0 = No  History of cerebrovascular disease --> 0 = No  Pre-operative treatment with insulin --> 0 = No  Pre-operative creatinine >2 mg/dL / 176.8 µmol/L --> 0 = No      1.) Preoperative workup as follows: chest x-ray, ECG, hemoglobin, hematocrit, electrolytes, creatinine, glucose, liver function studies.  2.) Change in medication regimen before surgery: Continue levothyroxine the morning of surgery. All other medications patient states he takes mid morning, can resume postop.  3.) Prophylaxis for cardiac events with perioperative beta-blockers: not indicated.  4.) Invasive hemodynamic monitoring perioperatively: at the discretion of  anesthesiologist.  5.) Deep vein thrombosis prophylaxis postoperatively: regimen to be chosen by surgical team.  6.) Surveillance for postoperative MI with ECG immediately postoperatively and on postoperati ve days 1 and 2 AND troponin levels 24 hours postoperatively and on day 4 or hospital discharge (whichever comes first): not indicated.  7.) Current medications which may produce withdrawal symptoms if withheld perioperatively: N/A  8.) Other measures: Postoperative hypertension management with IV hydralazine until able to take oral medications.  Postoperative incentive spirometry to prevent pneumonia.  Will need cardiac clearance from Dr. Carlos Adams at [facility], hx of A-fib on Xarelto. Stopped Xarelto and ASA on 5/10/25 however EKG from 5/19/25 showing marked bradycardia.   No NSAIDS at least 7 days before procedure      Prostate nodule  Scheduled for TRANSURETHRAL RESECTION OF BLADDER TUMOR (TURBT) with Dr. Jha on 5/29/25  -     Ambulatory referral/consult to Pre-Admit    PAF (paroxysmal atrial fibrillation)  Assessment & Plan:  -Followed by Dr. Carlos Adams at [facility]  -Will need cardiac clearance from Dr. Carlos Adams at [facility], hx of A-fib on Xarelto. EKG from 5/19/25 showed marked bradycardia w/ HR in 40 BPM. SR in office today, with HR in 70s  -Pt stopped Xarelto and ASA on 5/10/25 in anticipation of procedure

## 2025-05-27 NOTE — PRE-PROCEDURE INSTRUCTIONS
Pre op instructions reviewed with patient during Clinic Visit with Provider.    To confirm, Surgery is scheduled on Thurs, 5/29/25. We will call you late afternoon the business day prior to surgery with your arrival time.    *Please report to the [facility] (1st Floor) located off of [facility] (2nd Entrance/Building on the left, in front of the flag pole). Address: [address] [place] [address] [place] [address]       INSTRUCTIONS IMPORTANT!!!  DO NOT Eat, Drink, or Smoke after 12 midnight unless instructed otherwise by your Surgeon. OK to brush teeth, no gum, candy or mints!    Do not eat after 12 midnight, Do not smoke or use chewing tobacco after 12 midnight!  OK to brush teeth, but no gum, candy, or mints!       - Patients should stop full meals at midnight, but they can consume clear liquids up to 3 hours prior to scheduled arrival time.  -Clear liquids include Gatorade, water, soda, black coffee, jello or tea (no milk or creamer), and clear juices.  -Clear liquids do NOT include anything with pulp or food particles (Chicken broth, ice cream, yogurt etc.)             !!!!!! NOTHING RED OR PURPLE !!!!      MORNING OF SURGERY, drink small sip of water with the following medications instructed by Pre-Admit Provider:  levothyroxine         *Blood Thinners: Stop taking Xarelto & Aspirin per Cardiologist Instructions! Call your Surgeon office to inquire about any questions regarding your blood thinner medication. Pt reports stopping both on 5/10/25    Diabetic Patients: If you take diabetic or weight loss medication, Do NOT take morning of surgery unless instructed by Doctor. Metformin to be stopped 24 hrs prior to surgery.     DO NOT take long-acting insulin the evening before surgery. Blood sugars will be checked in pre-op by Nurse.    If you take Ozempic/ Mounjaro / Wegovy / Trulicity / Semaglutide, any weight loss injections OR PHENTERMINE --->>> PLEASE LET US KNOW IMMEDIATELY, as these medications  need to be stopped 7 days prior to surgery!    *Patients should HOLD all vitamins, herbal supplements, weight loss medication, aspirin products & NSAIDS 7 days prior to surgery, as these can thin the blood. Ok to take Tylenol.    ____  Avoid Alcoholic beverages 3 days prior to surgery, as it can thin the blood.  ____  NO Acrylic/fake nails or nail polish worn day of surgery (specifically hand/arm & foot surgeries).  ____  NO powder, lotions, deodorants, oils or cream on body.  ____  Remove all jewelry, piercings, & foreign objects prior to arrival and leave at home.  ____  Remove Dentures, Hearing Aids & Contact Lens prior to surgery.  ____  Bring photo ID and insurance information to hospital (Leave Valuables at Home).  ____  If going home the same day, arrange for a ride home. You will not be able to drive for 24 hrs if Anesthesia was used.   ____  Females (ages 11-60): may need to give a urine sample the morning of surgery; please see Pre op Nurse prior to using the restroom.  ____  Males: Stop ED medications (Viagra, Cialis) 24 hrs prior to surgery.  ____  Wear clean, loose fitting clothing to allow for dressings/ bandages.    Bathing Instructions:    -Shower with anti-bacterial Soap (ex: Hibiclens or Dial) the night before surgery and the morning of.   -Do not use Hibiclens on your face or genitals.   -Apply clean clothes after shower.  -Do not shave your face morning of surgery   -Do not shave your body 3 days prior to surgery unless instructed otherwise by your Surgeon.    [facility]Ride Policy:  Only 2 adults allowed in pre op/recovery area during your procedure. You MUST HAVE A RIDE HOME from a responsible adult that you know and trust. Medical Transport, Uber or Lyft can ONLY be used if patient has a responsible adult to accompany them during ride home.       *Signs and symptoms of Infection Before or After Surgery:               !!!If you experience any fever, chills, nausea/ vomiting, foul odor/  excessive drainage from surgical site, flu-like symptoms, new wounds or cuts, PLEASE CALL THE SURGEON OFFICE at [phone] or SEND MESSAGE THROUGH [employer] PORTAL!!!     *If you are running late day of surgery, please call the Surgery Dept @ [phone].    *Billing question, please call  [phone]     Thank you,  -[facility] Pre Admit Dept.  [phone]   or [phone]  M-F 7:30 am-4:00 pm (Closed Major Holidays)

## 2025-05-27 NOTE — DISCHARGE INSTRUCTIONS
To confirm, Surgery is scheduled on Thurs, 5/29/25. We will call you late afternoon the business day prior to surgery with your arrival time.    *Please report to the [facility] (1st Floor) located off of [facility] (2nd Entrance/Building on the left, in front of the flag pole). Address: [address] [place] [address] [place] [address]       INSTRUCTIONS IMPORTANT!!!  DO NOT Eat, Drink, or Smoke after 12 midnight unless instructed otherwise by your Surgeon. OK to brush teeth, no gum, candy or mints!    Do not eat after 12 midnight, Do not smoke or use chewing tobacco after 12 midnight!  OK to brush teeth, but no gum, candy, or mints!       - Patients should stop full meals at midnight, but they can consume clear liquids up to 3 hours prior to scheduled arrival time.  -Clear liquids include Gatorade, water, soda, black coffee, jello or tea (no milk or creamer), and clear juices.  -Clear liquids do NOT include anything with pulp or food particles (Chicken broth, ice cream, yogurt etc.)             !!!!!! NOTHING RED OR PURPLE !!!!      MORNING OF SURGERY, drink small sip of water with the following medications instructed by Pre-Admit Provider:  levothyroxine         *Blood Thinners: Stop taking Xarelto & Aspirin per Cardiologist Instructions! Call your Surgeon office to inquire about any questions regarding your blood thinner medication. Pt reports stopping both on 5/10/25    Diabetic Patients: If you take diabetic or weight loss medication, Do NOT take morning of surgery unless instructed by Doctor. Metformin to be stopped 24 hrs prior to surgery.     DO NOT take long-acting insulin the evening before surgery. Blood sugars will be checked in pre-op by Nurse.    If you take Ozempic/ Mounjaro / Wegovy / Trulicity / Semaglutide, any weight loss injections OR PHENTERMINE --->>> PLEASE LET US KNOW IMMEDIATELY, as these medications need to be stopped 7 days prior to surgery!    *Patients should HOLD all  vitamins, herbal supplements, weight loss medication, aspirin products & NSAIDS 7 days prior to surgery, as these can thin the blood. Ok to take Tylenol.    ____  Avoid Alcoholic beverages 3 days prior to surgery, as it can thin the blood.  ____  NO Acrylic/fake nails or nail polish worn day of surgery (specifically hand/arm & foot surgeries).  ____  NO powder, lotions, deodorants, oils or cream on body.  ____  Remove all jewelry, piercings, & foreign objects prior to arrival and leave at home.  ____  Remove Dentures, Hearing Aids & Contact Lens prior to surgery.  ____  Bring photo ID and insurance information to hospital (Leave Valuables at Home).  ____  If going home the same day, arrange for a ride home. You will not be able to drive for 24 hrs if Anesthesia was used.   ____  Females (ages 11-60): may need to give a urine sample the morning of surgery; please see Pre op Nurse prior to using the restroom.  ____  Males: Stop ED medications (Viagra, Cialis) 24 hrs prior to surgery.  ____  Wear clean, loose fitting clothing to allow for dressings/ bandages.    Bathing Instructions:    -Shower with anti-bacterial Soap (ex: Hibiclens or Dial) the night before surgery and the morning of.   -Do not use Hibiclens on your face or genitals.   -Apply clean clothes after shower.  -Do not shave your face morning of surgery   -Do not shave your body 3 days prior to surgery unless instructed otherwise by your Surgeon.    [facility]Ride Policy:  Only 2 adults allowed in pre op/recovery area during your procedure. You MUST HAVE A RIDE HOME from a responsible adult that you know and trust. Medical Transport, Uber or Lyft can ONLY be used if patient has a responsible adult to accompany them during ride home.       *Signs and symptoms of Infection Before or After Surgery:               !!!If you experience any fever, chills, nausea/ vomiting, foul odor/ excessive drainage from surgical site, flu-like symptoms, new wounds or  cuts, PLEASE CALL THE SURGEON OFFICE at [phone] or SEND MESSAGE THROUGH [employer] PORTAL!!!     *If you are running late day of surgery, please call the Surgery Dept @ [phone].    *Billing question, please call  [phone]     Thank you,  -[facility] Pre [facility].  [phone]   or [phone]  M-F 7:30 am-4:00 pm (Closed Major Holidays)

## 2025-05-28 NOTE — PRE-PROCEDURE INSTRUCTIONS
Called and spoke with SPOUSE about the following:     Please arrive to [facility] ([facility] at 10:00AM on 5/29/25 for your scheduled procedure.  Address: [address] [place] [address] [place] [address] (2nd Building on left, 1st Floor Lobby)    !!!NO FOOD after midnight! You may have clear liquids up to 3 hrs before your arrival to the Hospital!!!  Clear liquids include Gatorade, water, soda, black coffee or tea (no milk or creamer), and clear juices.  Clear liquids do NOT include anything with pulp or food particles (Chicken broth, ice cream, yogurt, Jello, etc.)    Thank you,  -[facility] Pre [facility]  Mon-Fri 7:30 am - 4 pm [phone]   
Labs/EKG/Imaging Studies/Medications

## 2025-05-29 ENCOUNTER — ANESTHESIA (OUTPATIENT)
Dept: SURGERY | Facility: HOSPITAL | Age: 85
End: 2025-05-29
Payer: MEDICARE

## 2025-05-29 ENCOUNTER — ANESTHESIA EVENT (OUTPATIENT)
Dept: SURGERY | Facility: HOSPITAL | Age: 85
End: 2025-05-29
Payer: MEDICARE

## 2025-05-29 ENCOUNTER — HOSPITAL ENCOUNTER (OUTPATIENT)
Facility: HOSPITAL | Age: 85
Discharge: HOME | End: 2025-05-29
Attending: UROLOGY | Admitting: UROLOGY
Payer: MEDICARE

## 2025-05-29 VITALS
TEMPERATURE: 97 F | BODY MASS INDEX: 25.41 KG/M2 | WEIGHT: 177.5 LBS | OXYGEN SATURATION: 95 % | SYSTOLIC BLOOD PRESSURE: 137 MMHG | HEIGHT: 70 IN | DIASTOLIC BLOOD PRESSURE: 81 MMHG | RESPIRATION RATE: 21 BRPM | HEART RATE: 58 BPM

## 2025-05-29 DIAGNOSIS — D49.4 BLADDER TUMOR: Primary | ICD-10-CM

## 2025-05-29 PROBLEM — I48.0 PAF (PAROXYSMAL ATRIAL FIBRILLATION): Status: ACTIVE | Noted: 2025-05-29

## 2025-05-29 PROBLEM — Z01.818 PREOPERATIVE EXAMINATION: Status: ACTIVE | Noted: 2025-05-29

## 2025-05-29 PROBLEM — N40.2 PROSTATE NODULE: Status: ACTIVE | Noted: 2025-05-29

## 2025-05-29 PROCEDURE — 37000008 HC ANESTHESIA 1ST 15 MINUTES: Performed by: UROLOGY

## 2025-05-29 PROCEDURE — 27201423 OPTIME MED/SURG SUP & DEVICES STERILE SUPPLY: Performed by: UROLOGY

## 2025-05-29 PROCEDURE — 63600175 PHARM REV CODE 636 W HCPCS: Performed by: UROLOGY

## 2025-05-29 PROCEDURE — 63600175 PHARM REV CODE 636 W HCPCS: Performed by: NURSE ANESTHETIST, CERTIFIED REGISTERED

## 2025-05-29 PROCEDURE — 71000015 HC POSTOP RECOV 1ST HR: Performed by: UROLOGY

## 2025-05-29 PROCEDURE — C1758 CATHETER, URETERAL: HCPCS | Performed by: UROLOGY

## 2025-05-29 PROCEDURE — 25500020 PHARM REV CODE 255: Performed by: UROLOGY

## 2025-05-29 PROCEDURE — 71000033 HC RECOVERY, INTIAL HOUR: Performed by: UROLOGY

## 2025-05-29 PROCEDURE — 52240 CYSTOSCOPY AND TREATMENT: CPT | Mod: ,,, | Performed by: UROLOGY

## 2025-05-29 PROCEDURE — 88307 TISSUE EXAM BY PATHOLOGIST: CPT | Mod: 26,,, | Performed by: STUDENT IN AN ORGANIZED HEALTH CARE EDUCATION/TRAINING PROGRAM

## 2025-05-29 PROCEDURE — 74420 UROGRAPHY RTRGR +-KUB: CPT | Mod: 26,,, | Performed by: UROLOGY

## 2025-05-29 PROCEDURE — 36000707: Performed by: UROLOGY

## 2025-05-29 PROCEDURE — 37000009 HC ANESTHESIA EA ADD 15 MINS: Performed by: UROLOGY

## 2025-05-29 PROCEDURE — 36000706: Performed by: UROLOGY

## 2025-05-29 PROCEDURE — 88307 TISSUE EXAM BY PATHOLOGIST: CPT | Mod: TC | Performed by: UROLOGY

## 2025-05-29 RX ORDER — FENTANYL CITRATE 50 UG/ML
INJECTION, SOLUTION INTRAMUSCULAR; INTRAVENOUS
Status: DISCONTINUED | OUTPATIENT
Start: 2025-05-29 | End: 2025-05-29

## 2025-05-29 RX ORDER — HYDROMORPHONE HYDROCHLORIDE 2 MG/ML
0.2 INJECTION, SOLUTION INTRAMUSCULAR; INTRAVENOUS; SUBCUTANEOUS EVERY 5 MIN PRN
Status: DISCONTINUED | OUTPATIENT
Start: 2025-05-29 | End: 2025-05-29 | Stop reason: HOSPADM

## 2025-05-29 RX ORDER — KETOROLAC TROMETHAMINE 30 MG/ML
15 INJECTION, SOLUTION INTRAMUSCULAR; INTRAVENOUS EVERY 8 HOURS PRN
Status: DISCONTINUED | OUTPATIENT
Start: 2025-05-29 | End: 2025-05-29 | Stop reason: HOSPADM

## 2025-05-29 RX ORDER — CIPROFLOXACIN 2 MG/ML
400 INJECTION, SOLUTION INTRAVENOUS
Status: COMPLETED | OUTPATIENT
Start: 2025-05-29 | End: 2025-05-29

## 2025-05-29 RX ORDER — ONDANSETRON HYDROCHLORIDE 2 MG/ML
4 INJECTION, SOLUTION INTRAVENOUS DAILY PRN
Status: DISCONTINUED | OUTPATIENT
Start: 2025-05-29 | End: 2025-05-29 | Stop reason: HOSPADM

## 2025-05-29 RX ORDER — PROPOFOL 10 MG/ML
VIAL (ML) INTRAVENOUS
Status: DISCONTINUED | OUTPATIENT
Start: 2025-05-29 | End: 2025-05-29

## 2025-05-29 RX ORDER — HYOSCYAMINE SULFATE 0.12 MG/1
0.25 TABLET SUBLINGUAL EVERY 4 HOURS PRN
Qty: 30 TABLET | Refills: 0 | Status: SHIPPED | OUTPATIENT
Start: 2025-05-29

## 2025-05-29 RX ORDER — OXYCODONE AND ACETAMINOPHEN 5; 325 MG/1; MG/1
1 TABLET ORAL
Status: DISCONTINUED | OUTPATIENT
Start: 2025-05-29 | End: 2025-05-29 | Stop reason: HOSPADM

## 2025-05-29 RX ORDER — LIDOCAINE HYDROCHLORIDE 20 MG/ML
INJECTION, SOLUTION EPIDURAL; INFILTRATION; INTRACAUDAL; PERINEURAL
Status: DISCONTINUED | OUTPATIENT
Start: 2025-05-29 | End: 2025-05-29

## 2025-05-29 RX ADMIN — LIDOCAINE HYDROCHLORIDE 100 MG: 20 INJECTION, SOLUTION EPIDURAL; INFILTRATION; INTRACAUDAL; PERINEURAL at 02:05

## 2025-05-29 RX ADMIN — CIPROFLOXACIN 400 MG: 2 INJECTION, SOLUTION INTRAVENOUS at 02:05

## 2025-05-29 RX ADMIN — PROPOFOL 50 MG: 10 INJECTION, EMULSION INTRAVENOUS at 02:05

## 2025-05-29 RX ADMIN — SODIUM CHLORIDE, SODIUM LACTATE, POTASSIUM CHLORIDE, AND CALCIUM CHLORIDE: .6; .31; .03; .02 INJECTION, SOLUTION INTRAVENOUS at 01:05

## 2025-05-29 RX ADMIN — FENTANYL CITRATE 100 MCG: 50 INJECTION, SOLUTION INTRAMUSCULAR; INTRAVENOUS at 02:05

## 2025-05-29 RX ADMIN — PROPOFOL 100 MG: 10 INJECTION, EMULSION INTRAVENOUS at 02:05

## 2025-05-29 NOTE — ANESTHESIA PREPROCEDURE EVALUATION
05/29/2025  Corey Lino is a 84 y.o., male    Past Medical History:   Diagnosis Date    Arthritis     Essential (primary) hypertension     Hypothyroidism, unspecified     Mixed hyperlipidemia     Paroxysmal atrial fibrillation      Past Surgical History:   Procedure Laterality Date    ANKLE SURGERY      cardiac loop monitor         Chemistry        Component Value Date/Time     05/10/2025 0515    K 4.6 05/10/2025 0515     05/10/2025 0515    CO2 26 05/10/2025 0515    BUN 18 05/10/2025 0515    CREATININE 1.07 05/10/2025 0515        Component Value Date/Time    CALCIUM 8.5 (L) 05/10/2025 0515    ESTGFRAFRICA 69 05/10/2025 0515        Lab Results   Component Value Date    WBC 7.1 05/03/2024    HGB 15.5 05/03/2024    HCT 46.7 05/03/2024     05/03/2024       Pre-op Assessment    I have reviewed the Patient Summary Reports.    I have reviewed the NPO Status.   I have reviewed the Medications.     Review of Systems  Anesthesia Hx:  No problems with previous Anesthesia   History of prior surgery of interest to airway management or planning:            Denies Personal Hx of Anesthesia complications.                    Social:  Non-Smoker, Alcohol Use       Hematology/Oncology:  Hematology Normal   Oncology Normal                                   Cardiovascular:     Hypertension              ECG has been reviewed. Paroxysmal atrial fibrillation                           Pulmonary:  Pulmonary Normal                       Renal/:  Renal/ Normal                 Musculoskeletal:  Arthritis               Neurological:  Neurology Normal                                      Endocrine:  Endocrine Normal                Physical Exam  General: Well nourished    Airway:  Mallampati: II   Mouth Opening: Normal  TM Distance: Normal  Neck ROM: Normal ROM    Dental:  Intact        Anesthesia Plan  Type  of Anesthesia, risks & benefits discussed:    Anesthesia Type: Gen Supraglottic Airway  Intra-op Monitoring Plan: Standard ASA Monitors  Post Op Pain Control Plan: multimodal analgesia  Induction:  IV  Airway Plan: , Post-Induction  Informed Consent: Informed consent signed with the Patient and all parties understand the risks and agree with anesthesia plan.  All questions answered.   ASA Score: 3  Anesthesia Plan Notes: Pt with bradycardia.  Seen and cleared by cards.  Has a loop recorder.    Ready For Surgery From Anesthesia Perspective.     .      Marked sinus bradycardia   Abnormal ECG   No previous ECGs available   Confirmed by Howie Lu ([id number]) on 5/19/2025 10:59:09 PM

## 2025-05-29 NOTE — TRANSFER OF CARE
"Anesthesia Transfer of Care Note    Patient: Corey Lino    Procedure(s) Performed: Procedure(s) (LRB):  TURBT (TRANSURETHRAL RESECTION OF BLADDER TUMOR) (N/A)  CYSTOSCOPY, WITH RETROGRADE PYELOGRAM (Bilateral)    Patient location: PACU    Anesthesia Type: general    Transport from OR: Transported from OR on room air with adequate spontaneous ventilation    Post pain: adequate analgesia    Post assessment: no apparent anesthetic complications    Post vital signs: stable    Level of consciousness: sedated    Nausea/Vomiting: no nausea/vomiting    Complications: none    Transfer of care protocol was followed      Last vitals: Visit Vitals  BP (!) 167/79 (BP Location: Right arm, Patient Position: Sitting)   Pulse (!) 41   Temp 36.8 °C (98.2 °F) (Temporal)   Resp 20   Ht 5' 10" (1.778 m)   Wt 80.5 kg (177 lb 7.5 oz)   SpO2 95%   BMI 25.46 kg/m²     "

## 2025-05-29 NOTE — ASSESSMENT & PLAN NOTE
-Followed by Dr. Carlos Adams at [facility]  -Will need cardiac clearance from Dr. Carlos Adams at [facility], hx of A-fib on Xarelto. EKG from 5/19/25 showed marked bradycardia w/ HR in 40 BPM. SR in office today  -Pt stopped Xarelto and ASA on 5/10/25

## 2025-05-29 NOTE — ASSESSMENT & PLAN NOTE
Known risk factors for perioperative complications: PAF    Difficulty with intubation is not anticipated.    Cardiac Risk Estimation:     Revised Cardiac Risk Index for Pre-Operative Risk from [employer]  on 5/29/2025  ** All calculations should be rechecked by clinician prior to use **    RESULT SUMMARY:  0 points  RCRI Score    3.9 %  Risk of major cardiac event      INPUTS:  High-risk surgery --> 0 = No  History of ischemic heart disease --> 0 = No  History of congestive heart failure --> 0 = No  History of cerebrovascular disease --> 0 = No  Pre-operative treatment with insulin --> 0 = No  Pre-operative creatinine >2 mg/dL / 176.8 µmol/L --> 0 = No      1.) Preoperative workup as follows: chest x-ray, ECG, hemoglobin, hematocrit, electrolytes, creatinine, glucose, liver function studies.  2.) Change in medication regimen before surgery: Continue levothyroxine the morning of surgery. All other medications patient states he takes mid morning, can resume postop.  3.) Prophylaxis for cardiac events with perioperative beta-blockers: not indicated.  4.) Invasive hemodynamic monitoring perioperatively: at the discretion of anesthesiologist.  5.) Deep vein thrombosis prophylaxis postoperatively: regimen to be chosen by surgical team.  6.) Surveillance for postoperative MI with ECG immediately postoperatively and on postoperati ve days 1 and 2 AND troponin levels 24 hours postoperatively and on day 4 or hospital discharge (whichever comes first): not indicated.  7.) Current medications which may produce withdrawal symptoms if withheld perioperatively: N/A  8.) Other measures: Postoperative hypertension management with IV hydralazine until able to take oral medications.  Postoperative incentive spirometry to prevent pneumonia.  Will need cardiac clearance from Dr. Carlos Adams at [facility],  of A-fib on Xarelto. Stopped Xarelto and ASA on 5/10/25 however EKG from 5/19/25 showing marked bradycardia.   No NSAIDS at least 7 days  before procedure

## 2025-05-29 NOTE — ANESTHESIA PROCEDURE NOTES
Intubation    Date/Time: 5/29/2025 2:03 PM    Performed by: Keri Madera CRNA  Authorized by: Ousmane Oliver II, MD    Intubation:     Induction:  Intravenous    Intubated:  Postinduction    Mask Ventilation:  Not attempted    Attempts:  1    Attempted By:  CRNA    Difficult Airway Encountered?: No      Complications:  None    Airway Device:  Supraglottic airway/LMA    Airway Device Size:  4.0    Placement Verified By:  Capnometry    Complicating Factors:  None    Findings Post-Intubation:  Atraumatic/condition of teeth unchanged

## 2025-05-29 NOTE — H&P
CC: BPH    History of Present Illness:   Corey Lino is a 84 y.o. male here for follow up.     5/29/25-TURBT today. No interval change in H&P.   5/16/25-Pt reports that he went on a 6 hour bike ride and came home and had gross hematuria. He was placed on cipro and ann was placed and CBI was initiated. He was discharged with the ann in place, but reports that he never had any difficulty urinating. He has been off of his Eliquis and ASA since the hospitalization.   10/7/24-didn't notice a dramatic difference in his urination with the flomax. He has had improvement with stream and emptying better. Nocturia x 2-3. IPSS 6. No straining or intermittency.   7/8/24-Pt developed A-fib and is now on Xarelto. Pt continues to cycle. LUTS are becoming more bothersome. He is having nocturia x 3. Denies weak stream. He also has some daytime frequency. No gross hematuria.   3/22/22- 80yo male referred for BPH checkup. Nocturia x 2. Not interested in medication. He previously saw Dr. El and trace msh was seen once, but didn't require workup at that time. Cytology was negative. Records reviewed. Stream is good. No gross hematuria or dysuria.   Pt is a cyclist. Biked 80 miles 10 days in a row for his 81th birthday.         Review of Systems   Constitutional:  Negative for chills and fever.   Respiratory:  Negative for shortness of breath.    Cardiovascular:  Negative for chest pain.   Gastrointestinal:  Negative for abdominal pain.   Genitourinary:  Positive for nocturia.   All other systems reviewed and are negative.      Past Medical History:   Diagnosis Date    Arthritis     Essential (primary) hypertension     Hypothyroidism, unspecified     Mixed hyperlipidemia     Paroxysmal atrial fibrillation        Past Surgical History:   Procedure Laterality Date    ANKLE SURGERY      cardiac loop monitor         No family history on file.    Social History     Tobacco Use    Smoking status: Never   Substance Use Topics     Alcohol use: Yes     Alcohol/week: 6.0 standard drinks of alcohol     Types: 6 Cans of beer per week       Current Facility-Administered Medications   Medication Dose Route Frequency Provider Last Rate Last Admin    ciprofloxacin (CIPRO)400mg/200ml D5W IVPB 400 mg  400 mg Intravenous On Call Procedure Meghan Jha MD           Review of patient's allergies indicates:   Allergen Reactions    Penicillins        Physical Exam  Vitals:    05/29/25 1017   BP: (!) 167/79   Pulse: (!) 41   Resp: 20   Temp: 98.2 °F (36.8 °C)       General: Well-developed, well-nourished in no acute distress  HEENT: Normocephalic, atraumatic, Extraocular movements intact  Neck: supple, trachea midline, no cervical or supraclavicular lymphadenopathy  Respirations: even and unlabored  Back: midline spine, no CVA tenderness  Rectal: 7/8/24-40g prostate, no nodules or tenderness. No gross blood  Extremities: atraumatic, moves all equally, no clubbing, cyanosis or edema  Psych: normal affect  Skin: warm and dry, no lesions  Neuro: Alert and oriented, Cranial nerves II-XII grossly intact      PSA  1/25/22- 1.8  5/3/24: 2.4    Assessment:   1. BPH with obstruction/lower urinary tract symptoms  POCT Urinalysis, Dipstick, Automated, W/O Scope      2. Microhematuria  Urinalysis Microscopic      3. Nocturia              Plan:  Bladder tumor  -     Place in Outpatient; Standing  -     Diet NPO; Standing  -     Place sequential compression device; Standing    Other orders  -     IP VTE LOW RISK PATIENT; Standing  -     ciprofloxacin (CIPRO)400mg/200ml D5W IVPB 400 mg  -     Admit to Phase 1 PACU, transfer to Phase 2 per protocol when indicated ; Standing  -     Vital signs; Standing  -     HYDROmorphone (PF) injection 0.2 mg; Refill: 0  -     oxyCODONE-acetaminophen 5-325 mg per tablet 1 tablet; Refill: 0  -     ketorolac injection 15 mg  -     ondansetron injection 4 mg  -     Intake and output Per protocol; Standing  -     Apply warming  blanket; Standing  -     Notify Anesthesiologist; Standing  -     Notify Physician - Potential Need of Opioid Reversal; Standing  -     Oxygen Continuous; Standing  -     Pulse Oximetry Continuous; Standing          Risks/benefits discussed. TURBT today.

## 2025-05-29 NOTE — OP NOTE
Date of Procedure: 05/29/2025    PREOP DIAGNOSIS:  Bladder tumor    POSTOP DIAGNOSIS:  Bladder tumor    PROCEDURES:      1. TURBT     2. Bilateral Retrogrades    3. Tumor fulguration    SURGEON:  Meghan Jha MD    Assistants: None    Specimen: Bladder tumor    ANESTHESIA:  General     BLOOD LOSS:  minimal    FINDINGS:  papillary bladder tumor along the posterior bladder wall and trigone completely resected and fulgurated.  Bilateral retrogrades were unremarkable.      PROCEDURE IN DETAIL:  Patient was brought to the operative suite and placed under general anesthesia and positioned into the dorsal lithotomy position.  After being sterilely prepped and draped a 21 Bulgarian sheath cystoscope was inserted into a normal urethra.   Bladder was examined, tumor was identified along the posterior bladder wall and trigone, papillary but somewhat flat in most regions.  Bilateral ureteral orifices are normal in size, shape, caliber and location.  Right ureteral orifice was cannulated with a Carolina catheter, contrast was injected.  There was no evidence of filling defects or other abnormalities, otherwise unremarkable right retrograde nephrogram.  Carolina was then inserted into the left ureteral orifice.  Contrast was injected demonstrating no evidence of filling defects or other abnormalities, otherwise unremarkable left retrograde nephrogram.  Cystoscope was removed, 24 Bulgarian resectoscope was then inserted into the urethra and bladder.  Dissection was continued until the tumor was completely dissected from the bladder wall.  I then fulgurated the edges and the base of the tumor.  The total size treated was >5cm.  At the conclusion hemostasis was meticulously maintained and there was no evidence of residual tumor burden within the bladder.  An 18fr Fontanez catheter was placed without difficulty, 10 mL of sterile water was placed within the bladder and it sat nicely open the bladder neck.  Patient was transferred to the PACU  in stable condition.      COMPLICATIONS: None

## 2025-05-29 NOTE — DISCHARGE SUMMARY
[facility] - [facility] (Hospital)  Discharge Note  Short Stay    Procedure(s) (LRB):  TURBT (TRANSURETHRAL RESECTION OF BLADDER TUMOR) (N/A)  CYSTOSCOPY, WITH RETROGRADE PYELOGRAM (Bilateral)      OUTCOME: Patient tolerated treatment/procedure well without complication and is now ready for discharge.    DISPOSITION: Home or Self Care    FINAL DIAGNOSIS:  Bladder tumor    FOLLOWUP: In clinic    DISCHARGE INSTRUCTIONS:    Discharge Procedure Orders   Diet diabetic     Notify your health care provider if you experience any of the following:  temperature >100.4     Notify your health care provider if you experience any of the following:  persistent nausea and vomiting or diarrhea     Notify your health care provider if you experience any of the following:  severe uncontrolled pain     No dressing needed     Activity as tolerated        TIME SPENT ON DISCHARGE: 10 minutes

## 2025-05-29 NOTE — ANESTHESIA POSTPROCEDURE EVALUATION
Anesthesia Post Evaluation    Patient: Corey Lino    Procedure(s) Performed: Procedure(s) (LRB):  TURBT (TRANSURETHRAL RESECTION OF BLADDER TUMOR) (N/A)  CYSTOSCOPY, WITH RETROGRADE PYELOGRAM (Bilateral)    Final Anesthesia Type: general      Patient location during evaluation: PACU  Patient participation: Yes- Able to Participate  Level of consciousness: awake and alert  Post-procedure vital signs: reviewed and stable  Pain management: adequate  Airway patency: patent  BRANDY mitigation strategies: Extubation while patient is awake  PONV status at discharge: No PONV  Anesthetic complications: no      Cardiovascular status: hemodynamically stable  Respiratory status: spontaneous ventilation  Hydration status: euvolemic  Follow-up not needed.              Vitals Value Taken Time   /81 05/29/25 15:25   Temp 36.3 °C (97.3 °F) 05/29/25 15:05   Pulse 56 05/29/25 15:26   Resp 42 05/29/25 15:26   SpO2 97 % 05/29/25 15:26   Vitals shown include unfiled device data.      Event Time   Out of Recovery 15:29:27         Pain/Bisi Score: Bisi Score: 10 (5/29/2025  3:40 PM)

## 2025-05-30 ENCOUNTER — TELEPHONE (OUTPATIENT)
Dept: UROLOGY | Facility: CLINIC | Age: 85
End: 2025-05-30
Payer: MEDICARE

## 2025-05-30 NOTE — TELEPHONE ENCOUNTER
Home health Nurse wants to know if the doctor wanted to send an order for them to change his ann catheter, or will it be changed monthly. Provider notified      ----- Message from Shu sent at 5/30/2025 11:01 AM CDT -----  Type: Patient CallWho Called: [facility] [place] [facility] the patient know what this is regarding? Requesting a call back to discuss if the dr wants to give orders for them to change the catheter if needed. Please advise Does the patient rather a call back or a response via [facility]? callBest Call Back Number: [phone] Information:

## 2025-06-02 ENCOUNTER — TELEPHONE (OUTPATIENT)
Dept: UROLOGY | Facility: CLINIC | Age: 85
End: 2025-06-02
Payer: MEDICARE

## 2025-06-04 LAB
DHEA SERPL-MCNC: NORMAL
ESTROGEN SERPL-MCNC: NORMAL PG/ML
INSULIN SERPL-ACNC: NORMAL U[IU]/ML
LAB AP CLINICAL INFORMATION: NORMAL
LAB AP GROSS DESCRIPTION: NORMAL
LAB AP PERFORMING LOCATION(S): NORMAL
LAB AP REPORT FOOTNOTES: NORMAL
T3RU NFR SERPL: NORMAL %

## 2025-06-06 ENCOUNTER — NURSE TRIAGE (OUTPATIENT)
Dept: ADMINISTRATIVE | Facility: CLINIC | Age: 85
End: 2025-06-06
Payer: MEDICARE

## 2025-06-06 ENCOUNTER — TELEPHONE (OUTPATIENT)
Dept: UROLOGY | Facility: CLINIC | Age: 85
End: 2025-06-06
Payer: MEDICARE

## 2025-06-09 ENCOUNTER — OFFICE VISIT (OUTPATIENT)
Dept: UROLOGY | Facility: CLINIC | Age: 85
End: 2025-06-09
Payer: MEDICARE

## 2025-06-09 ENCOUNTER — TELEPHONE (OUTPATIENT)
Dept: UROLOGY | Facility: CLINIC | Age: 85
End: 2025-06-09

## 2025-06-09 VITALS
DIASTOLIC BLOOD PRESSURE: 89 MMHG | RESPIRATION RATE: 18 BRPM | HEIGHT: 70 IN | BODY MASS INDEX: 26.22 KG/M2 | SYSTOLIC BLOOD PRESSURE: 154 MMHG | WEIGHT: 183.19 LBS | HEART RATE: 53 BPM

## 2025-06-09 DIAGNOSIS — R35.1 BPH ASSOCIATED WITH NOCTURIA: ICD-10-CM

## 2025-06-09 DIAGNOSIS — C67.4 MALIGNANT NEOPLASM OF POSTERIOR WALL OF BLADDER: Primary | ICD-10-CM

## 2025-06-09 DIAGNOSIS — N40.1 BPH ASSOCIATED WITH NOCTURIA: ICD-10-CM

## 2025-06-09 PROCEDURE — 3079F DIAST BP 80-89 MM HG: CPT | Mod: CPTII,S$GLB,, | Performed by: UROLOGY

## 2025-06-09 PROCEDURE — 1101F PT FALLS ASSESS-DOCD LE1/YR: CPT | Mod: CPTII,S$GLB,, | Performed by: UROLOGY

## 2025-06-09 PROCEDURE — 1159F MED LIST DOCD IN RCRD: CPT | Mod: CPTII,S$GLB,, | Performed by: UROLOGY

## 2025-06-09 PROCEDURE — 99214 OFFICE O/P EST MOD 30 MIN: CPT | Mod: S$GLB,,, | Performed by: UROLOGY

## 2025-06-09 PROCEDURE — 3288F FALL RISK ASSESSMENT DOCD: CPT | Mod: CPTII,S$GLB,, | Performed by: UROLOGY

## 2025-06-09 PROCEDURE — 99999 PR PBB SHADOW E&M-EST. PATIENT-LVL IV: CPT | Mod: PBBFAC,,, | Performed by: UROLOGY

## 2025-06-09 PROCEDURE — 3077F SYST BP >= 140 MM HG: CPT | Mod: CPTII,S$GLB,, | Performed by: UROLOGY

## 2025-06-09 PROCEDURE — 1126F AMNT PAIN NOTED NONE PRSNT: CPT | Mod: CPTII,S$GLB,, | Performed by: UROLOGY

## 2025-06-09 RX ORDER — CIPROFLOXACIN 500 MG/1
500 TABLET, FILM COATED ORAL 2 TIMES DAILY
Qty: 14 TABLET | Refills: 0 | Status: SHIPPED | OUTPATIENT
Start: 2025-06-09 | End: 2025-06-16

## 2025-06-09 NOTE — TELEPHONE ENCOUNTER
----- Message from Nurse Ruchi sent at 6/9/2025  3:09 PM CDT -----  Thanks Teddy,  I will take care of this.Ms. Hopper  ----- Message -----  From: Teddy Arcos CMA  Sent: 6/9/2025  11:47 AM CDT  To: Ruchi Gonsalves LPN    Good morning , this patient needs BCG treatment per 's request. Please get patient on wait list to be scheduled. Thank you!!

## 2025-06-09 NOTE — PROGRESS NOTES
CC: f/u TURBT    History of Present Illness:   Corey Lino is a 84 y.o. male here for follow up.     6/9/25-s/p TURBT 11 days ago. Path showed TaHG urothelial cell carcinoma. Muscularis propria present and uninvolved. Had some gross hematuria intermittently, but it has been very mild. Still holding his xarelto and has an appointment with his cardiologist later this week. Pt continues to take flomax. Ann bag started leaking but got a new bag from [employer] and was able to change it.   5/16/25-Pt reports that he went on a 6 hour bike ride and came home and had gross hematuria. He was placed on cipro and ann was placed and CBI was initiated. He was discharged with the ann in place, but reports that he never had any difficulty urinating. He has been off of his Eliquis and ASA since the hospitalization.   10/7/24-didn't notice a dramatic difference in his urination with the flomax. He has had improvement with stream and emptying better. Nocturia x 2-3. IPSS 6. No straining or intermittency.   7/8/24-Pt developed A-fib and is now on Xarelto. Pt continues to cycle. LUTS are becoming more bothersome. He is having nocturia x 3. Denies weak stream. He also has some daytime frequency. No gross hematuria.   3/22/22- 80yo male referred for BPH checkup. Nocturia x 2. Not interested in medication. He previously saw Dr. El and trace msh was seen once, but didn't require workup at that time. Cytology was negative. Records reviewed. Stream is good. No gross hematuria or dysuria.   Pt is a cyclist. Biked 80 miles 10 days in a row for his 81th birthday.         Review of Systems   Constitutional:  Negative for chills and fever.   Respiratory:  Negative for shortness of breath.    Cardiovascular:  Negative for chest pain.   Gastrointestinal:  Negative for abdominal pain.   All other systems reviewed and are negative.      Past Medical History:   Diagnosis Date    Arthritis     Essential (primary) hypertension      Hypothyroidism, unspecified     Mixed hyperlipidemia     Paroxysmal atrial fibrillation        Past Surgical History:   Procedure Laterality Date    ANKLE SURGERY      cardiac loop monitor      CYSTOSCOPY W/ RETROGRADES Bilateral 5/29/2025    Procedure: CYSTOSCOPY, WITH RETROGRADE PYELOGRAM;  Surgeon: Meghan Jha MD;  Location: [facility];  Service: Urology;  Laterality: Bilateral;    TURBT (TRANSURETHRAL RESECTION OF BLADDER TUMOR) N/A 5/29/2025    Procedure: TURBT (TRANSURETHRAL RESECTION OF BLADDER TUMOR);  Surgeon: Meghan Jha MD;  Location: [facility];  Service: Urology;  Laterality: N/A;       No family history on file.    Social History     Tobacco Use    Smoking status: Never   Substance Use Topics    Alcohol use: Yes     Alcohol/week: 6.0 standard drinks of alcohol     Types: 6 Cans of beer per week       Current Outpatient Medications   Medication Sig Dispense Refill    [Paused] aspirin (ECOTRIN) 81 MG EC tablet Take 81 mg by mouth.      atorvastatin (LIPITOR) 20 MG tablet Take 20 mg by mouth once daily.      betahistine HCl (BETAHISTINE, BULK, MISC) 24 mg by Misc.(Non-Drug; Combo Route) route.      hyoscyamine 0.125 mg Subl Place 2 tablets (0.25 mg total) under the tongue every 4 (four) hours as needed (bladder spasms). 30 tablet 0    JARDIANCE 25 mg tablet       levothyroxine (SYNTHROID) 137 MCG Tab Take by mouth before breakfast.      tamsulosin (FLOMAX) 0.4 mg Cap TAKE 1 CAPSULE(0.4 MG) BY MOUTH EVERY EVENING 30 capsule 11    [Paused] XARELTO 20 mg Tab       ciprofloxacin HCl (CIPRO) 500 MG tablet Take 1 tablet (500 mg total) by mouth 2 (two) times daily. for 7 days 14 tablet 0     No current facility-administered medications for this visit.       Review of patient's allergies indicates:   Allergen Reactions    Penicillins        Physical Exam  Vitals:    06/09/25 1115   BP: (!) 154/89   Pulse: (!) 53   Resp: 18         General: Well-developed, well-nourished in no acute distress  HEENT:  Normocephalic, atraumatic, Extraocular movements intact  Neck: supple, trachea midline, no cervical or supraclavicular lymphadenopathy  Respirations: even and unlabored  Back: midline spine, no CVA tenderness  : ann draining clear yellow urine  Rectal: 7/8/24-40g prostate, no nodules or tenderness. No gross blood  Extremities: atraumatic, moves all equally, no clubbing, cyanosis or edema  Psych: normal affect  Skin: warm and dry, no lesions  Neuro: Alert and oriented, Cranial nerves II-XII grossly intact    PSA  1/25/22- 1.8  5/3/24: 2.4        Assessment:   1. 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 for 7 days  Dispense: 14 tablet; Refill: 0    Discussed HGTa pathology. Will start BCG induction x 6 weeks. Risks/benefits and alternatives discussed.   Pt to hold xarelto while gross hematuria is intermittent as long as okay with his cardiologist.     Follow up in about 3 months (around 9/9/2025) for Cystoscopy.    I spent a total of 30 minutes on the day of the visit.  This includes face to face time and non-face to face time preparing to see the patient (eg, review of tests), obtaining and/or reviewing separately obtained history, documenting clinical information in the electronic or other health record, independently interpreting results and communicating results to the patient/family/caregiver, or care coordinator.

## 2025-06-10 DIAGNOSIS — C67.4 MALIGNANT NEOPLASM OF POSTERIOR WALL OF BLADDER: Primary | ICD-10-CM

## 2025-06-16 ENCOUNTER — TELEPHONE (OUTPATIENT)
Dept: UROLOGY | Facility: CLINIC | Age: 85
End: 2025-06-16
Payer: MEDICARE

## 2025-06-16 NOTE — TELEPHONE ENCOUNTER
Tried contacting patient reference to below. There was no answer, left voicemail stating to check with local [facility] pharmacy due to medication already having refills on it and was recently sent in on 05/19/2025.

## 2025-06-16 NOTE — TELEPHONE ENCOUNTER
Copied from CRM #[id number]. Topic: Medications - Medication Refill  >> Jun 16, 2025 10:47 AM Lindsay wrote:  Type:  RX Refill Request    Who Called: Corey Lino   Refill or New Rx:refill  RX Name and Strength: tamsulosin (FLOMAX) 0.4 mg Cap  How is the patient currently taking it? (ex. 1XDay):1x day  Is this a 30 day or 90 day RX: 90 capsules  Preferred Pharmacy with phone number:  [facility] #[id number] [address] & [address] [address]  Phone: [phone] Fax: [phone]  Local or Mail Order:Local  Ordering Provider:JAMAL Rawls.  Would the patient rather a call back or a response via [facility]? Call back  Best Call Back Number:[phone]  Additional Information:

## 2025-06-19 ENCOUNTER — PATIENT MESSAGE (OUTPATIENT)
Dept: UROLOGY | Facility: CLINIC | Age: 85
End: 2025-06-19
Payer: MEDICARE

## 2025-06-19 RX ORDER — TAMSULOSIN HYDROCHLORIDE 0.4 MG/1
0.4 CAPSULE ORAL DAILY
Qty: 90 CAPSULE | Refills: 3 | Status: SHIPPED | OUTPATIENT
Start: 2025-06-19

## 2025-07-01 ENCOUNTER — CLINICAL SUPPORT (OUTPATIENT)
Dept: UROLOGY | Facility: CLINIC | Age: 85
End: 2025-07-01
Payer: MEDICARE

## 2025-07-01 DIAGNOSIS — C67.4 MALIGNANT NEOPLASM OF POSTERIOR WALL OF BLADDER: Primary | ICD-10-CM

## 2025-07-01 PROBLEM — Z01.818 PREOPERATIVE EXAMINATION: Status: RESOLVED | Noted: 2025-05-29 | Resolved: 2025-07-01

## 2025-07-01 LAB
BILIRUBIN, UA POC OHS: NEGATIVE
BLOOD, UA POC OHS: ABNORMAL
CLARITY, UA POC OHS: CLEAR
COLOR, UA POC OHS: YELLOW
GLUCOSE, UA POC OHS: >=1000
KETONES, UA POC OHS: NEGATIVE
LEUKOCYTES, UA POC OHS: NEGATIVE
NITRITE, UA POC OHS: NEGATIVE
PH, UA POC OHS: 5.5
PROTEIN, UA POC OHS: ABNORMAL
SPECIFIC GRAVITY, UA POC OHS: 1.01
UROBILINOGEN, UA POC OHS: 0.2

## 2025-07-01 PROCEDURE — 99999 PR PBB SHADOW E&M-EST. PATIENT-LVL II: CPT | Mod: PBBFAC,,, | Performed by: NURSE PRACTITIONER

## 2025-07-01 NOTE — PROGRESS NOTES
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 Jha for malignant neoplasm of posterior wall of bladder.  Is presenting for 1st instillation of BCG. Urine in clinic was negative. No gross hematuria reported.  Abhijeet NIEVES  06/09/2025  Patient is a 84 y.o. male here for follow up.      6/9/25-s/p TURBT 11 days ago. Path showed TaHG urothelial cell carcinoma. Muscularis propria present and uninvolved. Had some gross hematuria intermittently, but it has been very mild. Still holding his xarelto and has an appointment with his cardiologist later this week. Pt continues to take flomax. Ann bag started leaking but got a new bag from [employer] and was able to change it.   5/16/25-Pt reports that he went on a 6 hour bike ride and came home and had gross hematuria. He was placed on cipro and ann was placed and CBI was initiated. He was discharged with the ann in place, but reports that he never had any difficulty urinating. He has been off of his Eliquis and ASA since the hospitalization.   10/7/24-didn't notice a dramatic difference in his urination with the flomax. He has had improvement with stream and emptying better. Nocturia x 2-3. IPSS 6. No straining or intermittency.   7/8/24-Pt developed A-fib and is now on Xarelto. Pt continues to cycle. LUTS are becoming more bothersome. He is having nocturia x 3. Denies weak stream. He also has some daytime frequency. No gross hematuria.   3/22/22- 80yo male referred for BPH checkup. Nocturia x 2. Not interested in medication. He previously saw Dr. El and trace msh was seen once, but didn't require workup at that time. Cytology was negative. Records reviewed. Stream is good. No gross hematuria or dysuria.   Pt is a cyclist. Biked 80 miles 10 days in a row for his 81th birthday.   Allergies:  Penicillins    Medications:  has a current medication list which includes the following  prescription(s): aspirin, atorvastatin, betahistine hcl, hyoscyamine, jardiance, levothyroxine, tamsulosin, and xarelto, and the following Facility-Administered Medications: BCG live (HARIKA) 50 mg in 0.9% NaCl 50 mL bladder instillation.    Review of Systems:  General: No fever, chills, fatigability, or weight loss.  Skin: No rashes, itching, or changes in color or texture of skin.  Chest: Denies ELLISON, cyanosis, wheezing, cough, and sputum production.  Abdomen: Appetite fine. No weight loss. Denies diarrhea, abdominal pain, hematemesis, or blood in stool.  Musculoskeletal: No joint stiffness or swelling. Denies back pain.  : As above.  All other review of systems negative.    PMH:   has a past medical history of Arthritis, Essential (primary) hypertension, Hypothyroidism, unspecified, Mixed hyperlipidemia, and Paroxysmal atrial fibrillation.    PSH:   has a past surgical history that includes Ankle surgery; cardiac loop monitor; turbt (transurethral resection of bladder tumor) (N/A, 5/29/2025); and Cystoscopy w/ retrogrades (Bilateral, 5/29/2025).    FamHx: none    SocHx:  reports that he has never smoked. He does not have any smokeless tobacco history on file. He reports current alcohol use of about 6.0 standard drinks of alcohol per week. No history on file for drug use.      Physical Exam:  General: A&Ox3, no apparent distress, no deformities  Neck: No masses, normal thyroid  Lungs: normal inspiration, no use of accessory muscles  Heart: normal pulse, no arrhythmias  Abdomen: Soft, NT, ND, no masses, no hernias, no hepatosplenomegaly  Lymphatic: Neck and groin nodes negative  Skin: The skin is warm and dry. No jaundice.  Ext: No c/c/e.      Labs/Studies:   See HPI    Impression/Plan:   1/6 BCG instillation  See nursing note

## 2025-07-01 NOTE — PROGRESS NOTES
.Patient in today for BCG treatment #1/6. One vial of BCG and 50 ml preservative free sodium chloride 0.9% mixed as per instructions. Using aseptic technique, a sterile fenestrated drape was place over perineum. Pt was catheterized using a #14Fr red rubber catheter to allow bladder emptying. Using closed system, one vial of BCG instilled via gravity directly into bladder. Pt tolerated well. Instructed to keep BCG solution in the bladder for 2 hours. Pt was given instructions to follow for the next 6 hours, including sitting on toilet at home and using 2 cups of undiluted chlorine bleach and closing the lid. Pt was told to allow bleach to stand for 15 minutes before flushing toilet. Pt was also told to drink plenty of water to flush any remaining BCG bacteria. Pt verbalized understanding.     Humaira Dumont LPN

## 2025-07-07 ENCOUNTER — HOSPITAL ENCOUNTER (EMERGENCY)
Facility: HOSPITAL | Age: 85
Discharge: HOME | End: 2025-07-07
Attending: FAMILY MEDICINE
Payer: MEDICARE

## 2025-07-07 VITALS
WEIGHT: 184 LBS | DIASTOLIC BLOOD PRESSURE: 77 MMHG | RESPIRATION RATE: 17 BRPM | SYSTOLIC BLOOD PRESSURE: 136 MMHG | BODY MASS INDEX: 26.4 KG/M2 | TEMPERATURE: 98 F | OXYGEN SATURATION: 97 % | HEART RATE: 62 BPM

## 2025-07-07 DIAGNOSIS — C67.9 MALIGNANT NEOPLASM OF URINARY BLADDER, UNSPECIFIED SITE: Primary | ICD-10-CM

## 2025-07-07 DIAGNOSIS — K62.89 RECTAL MASS: ICD-10-CM

## 2025-07-07 LAB
ABSOLUTE EOSINOPHIL (OHS): 0.25 K/UL
ABSOLUTE MONOCYTE (OHS): 0.59 K/UL (ref 0.3–1)
ABSOLUTE NEUTROPHIL COUNT (OHS): 3.93 K/UL (ref 1.8–7.7)
ALBUMIN SERPL BCP-MCNC: 4 G/DL (ref 3.5–5.2)
ALP SERPL-CCNC: 83 UNIT/L (ref 40–150)
ALT SERPL W/O P-5'-P-CCNC: 14 UNIT/L (ref 10–44)
ANION GAP (OHS): 10 MMOL/L (ref 8–16)
AST SERPL-CCNC: 13 UNIT/L (ref 11–45)
BACTERIA #/AREA URNS AUTO: ABNORMAL /HPF
BASOPHILS # BLD AUTO: 0.06 K/UL
BASOPHILS NFR BLD AUTO: 0.9 %
BILIRUB SERPL-MCNC: 0.7 MG/DL (ref 0.1–1)
BILIRUB UR QL STRIP.AUTO: NEGATIVE
BUN SERPL-MCNC: 19 MG/DL (ref 8–23)
CALCIUM SERPL-MCNC: 9.2 MG/DL (ref 8.7–10.5)
CHLORIDE SERPL-SCNC: 106 MMOL/L (ref 95–110)
CLARITY UR: CLEAR
CO2 SERPL-SCNC: 21 MMOL/L (ref 23–29)
COLOR UR AUTO: YELLOW
CREAT SERPL-MCNC: 1 MG/DL (ref 0.5–1.4)
ERYTHROCYTE [DISTWIDTH] IN BLOOD BY AUTOMATED COUNT: 13 % (ref 11.5–14.5)
GFR SERPLBLD CREATININE-BSD FMLA CKD-EPI: >60 ML/MIN/1.73/M2
GLUCOSE SERPL-MCNC: 162 MG/DL (ref 70–110)
GLUCOSE UR QL STRIP: ABNORMAL
HCT VFR BLD AUTO: 49.6 % (ref 40–54)
HGB BLD-MCNC: 16.7 GM/DL (ref 14–18)
HGB UR QL STRIP: ABNORMAL
IMM GRANULOCYTES # BLD AUTO: 0.02 K/UL (ref 0–0.04)
IMM GRANULOCYTES NFR BLD AUTO: 0.3 % (ref 0–0.5)
KETONES UR QL STRIP: NEGATIVE
LEUKOCYTE ESTERASE UR QL STRIP: NEGATIVE
LYMPHOCYTES # BLD AUTO: 1.81 K/UL (ref 1–4.8)
MCH RBC QN AUTO: 32.3 PG (ref 27–31)
MCHC RBC AUTO-ENTMCNC: 33.7 G/DL (ref 32–36)
MCV RBC AUTO: 96 FL (ref 82–98)
MICROSCOPIC COMMENT: ABNORMAL
NITRITE UR QL STRIP: NEGATIVE
NUCLEATED RBC (/100WBC) (OHS): 0 /100 WBC
PH UR STRIP: 5 [PH]
PLATELET # BLD AUTO: 188 K/UL (ref 150–450)
PMV BLD AUTO: 10.2 FL (ref 9.2–12.9)
POTASSIUM SERPL-SCNC: 4 MMOL/L (ref 3.5–5.1)
PROT SERPL-MCNC: 7.8 GM/DL (ref 6–8.4)
PROT UR QL STRIP: NEGATIVE
RBC # BLD AUTO: 5.17 M/UL (ref 4.6–6.2)
RBC #/AREA URNS AUTO: 5 /HPF (ref 0–4)
RELATIVE EOSINOPHIL (OHS): 3.8 %
RELATIVE LYMPHOCYTE (OHS): 27.2 % (ref 18–48)
RELATIVE MONOCYTE (OHS): 8.9 % (ref 4–15)
RELATIVE NEUTROPHIL (OHS): 58.9 % (ref 38–73)
SODIUM SERPL-SCNC: 137 MMOL/L (ref 136–145)
SP GR UR STRIP: 1.02
UROBILINOGEN UR STRIP-ACNC: NEGATIVE EU/DL
WBC # BLD AUTO: 6.66 K/UL (ref 3.9–12.7)
WBC #/AREA URNS AUTO: 10 /HPF (ref 0–5)
YEAST UR QL AUTO: ABNORMAL /HPF

## 2025-07-07 PROCEDURE — 81003 URINALYSIS AUTO W/O SCOPE: CPT | Performed by: NURSE PRACTITIONER

## 2025-07-07 PROCEDURE — 85025 COMPLETE CBC W/AUTO DIFF WBC: CPT | Performed by: NURSE PRACTITIONER

## 2025-07-07 PROCEDURE — 25500020 PHARM REV CODE 255: Performed by: FAMILY MEDICINE

## 2025-07-07 PROCEDURE — 99285 EMERGENCY DEPT VISIT HI MDM: CPT | Mod: 25

## 2025-07-07 PROCEDURE — 84450 TRANSFERASE (AST) (SGOT): CPT | Performed by: NURSE PRACTITIONER

## 2025-07-07 RX ADMIN — IOHEXOL 100 ML: 350 INJECTION, SOLUTION INTRAVENOUS at 03:07

## 2025-07-07 NOTE — FIRST PROVIDER EVALUATION
Medical screening examination initiated.  I have conducted a focused provider triage encounter, findings are as follows:    Brief history of present illness:  reports rectal mass. Sent to er for scan    Vitals:    07/07/25 1414   BP: (!) 156/94   Pulse: 71   Resp: 20   Temp: 97.9 °F (36.6 °C)   TempSrc: Oral   SpO2: 96%   Weight: 83.5 kg (184 lb)       Pertinent physical exam:  nad    Brief workup plan:  labs, imaging     Preliminary workup initiated; this workup will be continued and followed by the physician or advanced practice provider that is assigned to the patient when roomed.

## 2025-07-07 NOTE — ED PROVIDER NOTES
SCRIBE #1 NOTE: I, Liset Matamoros, am scribing for, and in the presence of, Ousmane Calles MD. I have scribed the entire note.       History     Chief Complaint   Patient presents with    Mass     Lump in rectum, noticed yesterday. Denies pain.      Review of patient's allergies indicates:   Allergen Reactions    Penicillins          History of Present Illness     HPI    7/7/2025, 4:30 PM  History obtained from the patient and medical records      History of Present Illness: Corey Lino is a 84 y.o. male patient with a PMHx of HTN, HLD, and bladder cancer who presents to the Emergency Department for evaluation of a mass which began 2 days PTA. Pt reports feeling pressure and incomplete emptying when using the restroom. Pt underwent a transurethral bladder tumor resection on 5/29/25 (Dr. Jha). Symptoms are constant and moderate in severity. No mitigating or exacerbating factors reported. Patient denies any fever, rectal bleeding, or abdominal pain. No prior Tx specified.  No further complaints or concerns at this time.       Arrival mode: Personal Transportation    PCP: Roxanne Ann        Past Medical History:  Past Medical History:   Diagnosis Date    Arthritis     Essential (primary) hypertension     Hypothyroidism, unspecified     Mixed hyperlipidemia     Paroxysmal atrial fibrillation        Past Surgical History:  Past Surgical History:   Procedure Laterality Date    ANKLE SURGERY      cardiac loop monitor      CYSTOSCOPY W/ RETROGRADES Bilateral 5/29/2025    Procedure: CYSTOSCOPY, WITH RETROGRADE PYELOGRAM;  Surgeon: Meghan Jha MD;  Location: [facility] OR;  Service: Urology;  Laterality: Bilateral;    TURBT (TRANSURETHRAL RESECTION OF BLADDER TUMOR) N/A 5/29/2025    Procedure: TURBT (TRANSURETHRAL RESECTION OF BLADDER TUMOR);  Surgeon: Meghan Jha MD;  Location: [facility] OR;  Service: Urology;  Laterality: N/A;         Family History:  No family history on file.    Social  History:  Social History     Tobacco Use    Smoking status: Never    Smokeless tobacco: Not on file   Substance and Sexual Activity    Alcohol use: Yes     Alcohol/week: 6.0 standard drinks of alcohol     Types: 6 Cans of beer per week    Drug use: Never    Sexual activity: Not Currently        Review of Systems     Review of Systems   Constitutional:  Negative for fever.   HENT:  Negative for sore throat.    Respiratory:  Negative for shortness of breath.    Cardiovascular:  Negative for chest pain.   Gastrointestinal:  Negative for abdominal pain, anal bleeding and nausea.        (+) 1.5 cm smooth anterior rectal mass   Genitourinary:  Negative for dysuria.   Musculoskeletal:  Negative for back pain.   Skin:  Negative for rash.   Neurological:  Negative for weakness.   Hematological:  Does not bruise/bleed easily.   All other systems reviewed and are negative.       Physical Exam     Initial Vitals [07/07/25 1414]   BP Pulse Resp Temp SpO2   (!) 156/94 71 20 97.9 °F (36.6 °C) 96 %      MAP       --          Physical Exam  Nursing Notes and Vital Signs Reviewed.  Constitutional: Patient is in no apparent distress. Well-developed and well-nourished.  Head: Atraumatic. Normocephalic.  Eyes: PERRL. EOM intact. Conjunctivae are not pale. No scleral icterus.  ENT: Mucous membranes are moist. Oropharynx is clear and symmetric.    Neck: Supple. Full ROM. No lymphadenopathy.  Cardiovascular: Regular rate. Regular rhythm. No murmurs, rubs, or gallops. Distal pulses are 2+ and symmetric.  Pulmonary/Chest: No respiratory distress. Clear to auscultation bilaterally. No wheezing or rales.  Abdominal: Soft and non-distended. There is no tenderness.  No rebound, guarding, or rigidity. Good bowel sounds.  Genitourinary: No CVA tenderness.  Rectal: 1.5 cm smooth anterior mass.  Musculoskeletal: Moves all extremities. No obvious deformities. No edema. No calf tenderness.  Skin: Warm and dry.  Neurological:  Alert, awake, and  appropriate.  Normal speech.  No acute focal neurological deficits are appreciated.  Psychiatric: Normal affect. Good eye contact. Appropriate in content.     ED Course   Procedures  ED Vital Signs:  Vitals:    07/07/25 1414 07/07/25 1520 07/07/25 1615 07/07/25 1700   BP: (!) 156/94 (!) 153/89 133/82 131/76   Pulse: 71 73 69 66   Resp: 20 18 18 17   Temp: 97.9 °F (36.6 °C)      TempSrc: Oral      SpO2: 96% 100% 98% 97%   Weight: 83.5 kg (184 lb)       07/07/25 1722 07/07/25 1830   BP: 120/79 136/77   Pulse: 67 62   Resp: 18 17   Temp:  98.1 °F (36.7 °C)   TempSrc:  Oral   SpO2: 97% 97%   Weight:         Abnormal Lab Results:  Labs Reviewed   COMPREHENSIVE METABOLIC PANEL - Abnormal       Result Value    Sodium 137      Potassium 4.0      Chloride 106      CO2 21 (*)     Glucose 162 (*)     BUN 19      Creatinine 1.0      Calcium 9.2      Protein Total 7.8      Albumin 4.0      Bilirubin Total 0.7      ALP 83      AST 13      ALT 14      Anion Gap 10      eGFR >60     URINALYSIS, REFLEX TO URINE CULTURE - Abnormal    Color, UA Yellow      Appearance, UA Clear      pH, UA 5.0      Spec Grav UA 1.025      Protein, UA Negative      Glucose, UA 4+ (*)     Ketones, UA Negative      Bilirubin, UA Negative      Blood, UA 1+ (*)     Nitrites, UA Negative      Urobilinogen, UA Negative      Leukocyte Esterase, UA Negative     CBC WITH DIFFERENTIAL - Abnormal    WBC 6.66      RBC 5.17      HGB 16.7      HCT 49.6      MCV 96      MCH 32.3 (*)     MCHC 33.7      RDW 13.0      Platelet Count 188      MPV 10.2      Nucleated RBC 0      Neut % 58.9      Lymph % 27.2      Mono % 8.9      Eos % 3.8      Basophil % 0.9      Imm Grans % 0.3      Neut # 3.93      Lymph # 1.81      Mono # 0.59      Eos # 0.25      Baso # 0.06      Imm Grans # 0.02     URINALYSIS MICROSCOPIC - Abnormal    RBC, UA 5 (*)     WBC, UA 10 (*)     Bacteria, UA Rare      Yeast, UA None      Microscopic Comment       CBC W/ AUTO DIFFERENTIAL    Narrative:     The  following orders were created for panel order CBC auto differential.  Procedure                               Abnormality         Status                     ---------                               -----------         ------                     CBC with Differential[[id number]]       Abnormal            Final result                 Please view results for these tests on the individual orders.   GREY TOP URINE HOLD    Extra Tube Hold for add-ons.          All Lab Results:  Results for orders placed or performed during the hospital encounter of 07/07/25   Comprehensive metabolic panel    Collection Time: 07/07/25  2:54 PM   Result Value Ref Range    Sodium 137 136 - 145 mmol/L    Potassium 4.0 3.5 - 5.1 mmol/L    Chloride 106 95 - 110 mmol/L    CO2 21 (L) 23 - 29 mmol/L    Glucose 162 (H) 70 - 110 mg/dL    BUN 19 8 - 23 mg/dL    Creatinine 1.0 0.5 - 1.4 mg/dL    Calcium 9.2 8.7 - 10.5 mg/dL    Protein Total 7.8 6.0 - 8.4 gm/dL    Albumin 4.0 3.5 - 5.2 g/dL    Bilirubin Total 0.7 0.1 - 1.0 mg/dL    ALP 83 40 - 150 unit/L    AST 13 11 - 45 unit/L    ALT 14 10 - 44 unit/L    Anion Gap 10 8 - 16 mmol/L    eGFR >60 >60 mL/min/1.73/m2   CBC with Differential    Collection Time: 07/07/25  2:54 PM   Result Value Ref Range    WBC 6.66 3.90 - 12.70 K/uL    RBC 5.17 4.60 - 6.20 M/uL    HGB 16.7 14.0 - 18.0 gm/dL    HCT 49.6 40.0 - 54.0 %    MCV 96 82 - 98 fL    MCH 32.3 (H) 27.0 - 31.0 pg    MCHC 33.7 32.0 - 36.0 g/dL    RDW 13.0 11.5 - 14.5 %    Platelet Count 188 150 - 450 K/uL    MPV 10.2 9.2 - 12.9 fL    Nucleated RBC 0 <=0 /100 WBC    Neut % 58.9 38 - 73 %    Lymph % 27.2 18 - 48 %    Mono % 8.9 4 - 15 %    Eos % 3.8 <=8 %    Basophil % 0.9 <=1.9 %    Imm Grans % 0.3 0.0 - 0.5 %    Neut # 3.93 1.8 - 7.7 K/uL    Lymph # 1.81 1 - 4.8 K/uL    Mono # 0.59 0.3 - 1 K/uL    Eos # 0.25 <=0.5 K/uL    Baso # 0.06 <=0.2 K/uL    Imm Grans # 0.02 0.00 - 0.04 K/uL   Urinalysis, Reflex to Urine Culture Urine, Clean Catch    Collection  Time: 07/07/25  3:11 PM    Specimen: Urine, Clean Catch   Result Value Ref Range    Color, UA Yellow Straw, Didi, Yellow, Light-Orange    Appearance, UA Clear Clear    pH, UA 5.0 5.0 - 8.0    Spec Grav UA 1.025 1.005 - 1.030    Protein, UA Negative Negative    Glucose, UA 4+ (A) Negative    Ketones, UA Negative Negative    Bilirubin, UA Negative Negative    Blood, UA 1+ (A) Negative    Nitrites, UA Negative Negative    Urobilinogen, UA Negative <2.0 EU/dL    Leukocyte Esterase, UA Negative Negative   GREY TOP URINE HOLD    Collection Time: 07/07/25  3:11 PM   Result Value Ref Range    Extra Tube Hold for add-ons.    Urinalysis Microscopic    Collection Time: 07/07/25  3:11 PM   Result Value Ref Range    RBC, UA 5 (H) 0 - 4 /HPF    WBC, UA 10 (H) 0 - 5 /HPF    Bacteria, UA Rare None, Rare, Occasional /HPF    Yeast, UA None None /HPF    Microscopic Comment         Imaging Results:  Imaging Results              X-Ray Abdomen AP 1 View (KUB) (Final result)  Result time 07/07/25 17:06:49      Final result by Alex Wise MD (07/07/25 17:06:49)                   Impression:      Moderate constipation.      Electronically signed by: Alex Wise MD  Date:    07/07/2025  Time:    17:06               Narrative:    EXAMINATION:  XR ABDOMEN AP 1 VIEW    CLINICAL HISTORY:  Other specified diseases of anus and rectum    TECHNIQUE:  Two view of the abdomen was performed.    COMPARISON:  None    FINDINGS:  Nonobstructive bowel gas pattern.  Moderate constipation.    No obvious free air.  No portal venous gas.    No acute fracture. Mild DJD. Lung bases are clear.  Contrast is seen within the urinary bladder.  Lung bases are clear.                                       CT Abdomen Pelvis With IV Contrast NO Oral Contrast (Final result)  Result time 07/07/25 16:11:46      Final result by Janette Solis MD (07/07/25 16:11:46)                   Impression:       Eccentric urinary bladder wall thickening concerning for  neoplasm for which direct visualization/cystoscopy recommended    All CT scans at [this location] are performed using dose modulation techniques as appropriate to a performed exam including the following: automated exposure control; adjustment of the mA and/or kV according to patient size (this includes techniques or standardized protocols for targeted exams where dose is matched to indication / reason for exam; i.e. extremities or head); use of iterative reconstruction technique.    Finalized on: 7/7/2025 4:11 PM By:  Janette Solis MD  [facility]# [id number]      2025-07-07 16:13:51.312     [facility]               Narrative:    EXAM: CT ABDOMEN PELVIS WITH IV CONTRAST    CLINICAL INDICATION:  Abdominal abscess infection    TECHNIQUE: Axial and multiplanar 2-D reformations provided  CT abdomen and pelvis with intravenous contrast enhancement  No relevant comparison    FINDINGS:  Few subcentimeter circumscribed hepatic hypodensities in the liver may be fatty area gallbladder present  Spleen normal size  Pancreas unremarkable  Kidneys without hydronephrosis.  Small circumscribed renal hypodensities may be cysts too small to further characterize  Aortoiliac ectasia.    Eccentric mural thickening right posterior appears to enhance  Prostamegaly    No signs of appendicitis or other inflammatory bowel findings.  Colonic diverticulosis present.  No obstructive findings.                                                    The Emergency Provider reviewed the vital signs and test results, which are outlined above.     ED Discussion     6:10 PM: Discussed pt's case with Denzel Lynn MD (Urology) who does not recommend the pt be admitted.        6:12 PM: Reassessed pt at this time. Discussed with patient and/or family/caretaker all pertinent ED information and results. Discussed pt dx and plan of tx. Gave the patient all f/u and return to the ED instructions. All questions and concerns were addressed at this time. Patient and/or  family/caretaker expresses understanding of information and instructions, and is comfortable with plan to discharge. Pt is stable for discharge.     I discussed with patient and/or family/caretaker that evaluation in the ED does not suggest any emergent or life threatening medical conditions requiring immediate intervention beyond what was provided in the ED, and I believe patient is safe for discharge. Regardless, an unremarkable evaluation in the ED does not preclude the development or presence of a serious or life threatening condition. As such, I instructed that the patient is to return immediately for any worsening or change in current symptoms.         Medical Decision Making  84-year-old [place] male with a history of bladder cancer followed by urology.  Patient has a expanding bladder mass in his rectum.  He has has a sensation of a rectal mass.    CT scan reveals prominent rectal mass suggestive of expanding bladder tumor.  Patient will be referred back to Dr. Jha for further workup and treatment    Certainly it is quite possible that BCG treatments have caused the mucosal thickening and caused the tumor to be come more edematous.    Amount and/or Complexity of Data Reviewed  Labs: ordered. Decision-making details documented in ED Course.  Radiology: ordered. Decision-making details documented in ED Course.    Risk  Prescription drug management.                ED Medication(s):  Medications   iohexoL (OMNIPAQUE 350) injection 100 mL (100 mLs Intravenous Given 7/7/25 1552)       Discharge Medication List as of 7/7/2025  6:15 PM           Follow-up Information       Call  Meghan Jha MD.    Specialty: Urology  Contact information:  [id number] [facility] [address] [phone]                                 Scribe Attestation:   Scribe #1: I performed the above scribed service and the documentation accurately describes the services I performed. I attest to the accuracy of the  note.     Attending:   Physician Attestation Statement for Scribe #1: I, Ousmane Calles MD, personally performed the services described in this documentation, as scribed by Liset Matamoros, in my presence, and it is both accurate and complete.           Clinical Impression       ICD-10-CM ICD-9-CM   1. Malignant neoplasm of urinary bladder, unspecified site  C67.9 188.9   2. Rectal mass  K62.89 787.99       Disposition:   Disposition: Discharged  Condition: Stable         Ousmane Calles MD  07/10/25 1543

## 2025-07-08 ENCOUNTER — OFFICE VISIT (OUTPATIENT)
Dept: UROLOGY | Facility: CLINIC | Age: 85
End: 2025-07-08
Payer: MEDICARE

## 2025-07-08 DIAGNOSIS — D49.4 BLADDER TUMOR: Primary | ICD-10-CM

## 2025-07-08 DIAGNOSIS — C67.4 MALIGNANT NEOPLASM OF POSTERIOR WALL OF BLADDER: ICD-10-CM

## 2025-07-08 LAB
BILIRUBIN, UA POC OHS: NEGATIVE
BLOOD, UA POC OHS: ABNORMAL
CLARITY, UA POC OHS: CLEAR
COLOR, UA POC OHS: YELLOW
GLUCOSE, UA POC OHS: 500
HOLD SPECIMEN: NORMAL
KETONES, UA POC OHS: NEGATIVE
LEUKOCYTES, UA POC OHS: NEGATIVE
NITRITE, UA POC OHS: NEGATIVE
PH, UA POC OHS: 5.5
PROTEIN, UA POC OHS: NEGATIVE
SPECIFIC GRAVITY, UA POC OHS: 1.01
UROBILINOGEN, UA POC OHS: 0.2

## 2025-07-08 PROCEDURE — 1159F MED LIST DOCD IN RCRD: CPT | Mod: CPTII,S$GLB,, | Performed by: NURSE PRACTITIONER

## 2025-07-08 PROCEDURE — 1160F RVW MEDS BY RX/DR IN RCRD: CPT | Mod: CPTII,S$GLB,, | Performed by: NURSE PRACTITIONER

## 2025-07-08 PROCEDURE — 81003 URINALYSIS AUTO W/O SCOPE: CPT | Mod: QW,S$GLB,, | Performed by: NURSE PRACTITIONER

## 2025-07-08 PROCEDURE — 99999 PR PBB SHADOW E&M-EST. PATIENT-LVL II: CPT | Mod: PBBFAC,,, | Performed by: NURSE PRACTITIONER

## 2025-07-08 PROCEDURE — 99214 OFFICE O/P EST MOD 30 MIN: CPT | Mod: S$GLB,,, | Performed by: NURSE PRACTITIONER

## 2025-07-08 NOTE — PROGRESS NOTES
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  States that he had a bowel movement several days ago and felt unusual rectal mass.  Proceeded to ED CT abdomen and pelvis indicated bladder cancer and an enlarged prostate.  KUB indicated constipation.  Patient states that ED physician informed him that BCG caused his rectal mass and needed to be seen immediately by urology provider.  Patient denies gross hematuria.    Allergies:  Penicillins    Medications:  has a current medication list which includes the following prescription(s): aspirin, atorvastatin, betahistine hcl, hyoscyamine, jardiance, levothyroxine, tamsulosin, and xarelto.    Review of Systems:  General: No fever, chills, fatigability, or weight loss.  Skin: No rashes, itching, or changes in color or texture of skin.  Chest: Denies ELLISON, cyanosis, wheezing, cough, and sputum production.  Abdomen: Appetite fine. No weight loss. Denies diarrhea, abdominal pain, hematemesis, or blood in stool.  Musculoskeletal: No joint stiffness or swelling. Denies back pain.  : As above.  All other review of systems negative.    PMH:   has a past medical history of Arthritis, Essential (primary) hypertension, Hypothyroidism, unspecified, Mixed hyperlipidemia, and Paroxysmal atrial fibrillation.    PSH:   has a past surgical history that includes Ankle surgery; cardiac loop monitor; turbt (transurethral resection of bladder tumor) (N/A, 5/29/2025); and Cystoscopy w/ retrogrades (Bilateral, 5/29/2025).    FamHx: family history is not on file.    SocHx:  reports that he has never smoked. He does not have any smokeless tobacco history on file. He reports current alcohol use of about 6.0 standard drinks of alcohol per week. He reports that he does not use drugs.      Physical Exam:  General: A&Ox3, no apparent distress, no deformities  Neck: No masses,  normal thyroid  Lungs: normal inspiration, no use of accessory muscles  Heart: normal pulse, no arrhythmias  Abdomen: Soft, NT, ND, no masses, no hernias, no hepatosplenomegaly  Lymphatic: Neck and groin nodes negative  Skin: The skin is warm and dry. No jaundice.  Ext: No c/c/e.  :  Normal rectal tone, no hemorrhoids.  Enlarged prostate.  Labs/Studies:   07/07/2025  EXAM: CT ABDOMEN PELVIS WITH IV CONTRAST     CLINICAL INDICATION:  Abdominal abscess infection     TECHNIQUE: Axial and multiplanar 2-D reformations provided  CT abdomen and pelvis with intravenous contrast enhancement  No relevant comparison     FINDINGS:  Few subcentimeter circumscribed hepatic hypodensities in the liver may be fatty area gallbladder present  Spleen normal size  Pancreas unremarkable  Kidneys without hydronephrosis.  Small circumscribed renal hypodensities may be cysts too small to further characterize  Aortoiliac ectasia.     Eccentric mural thickening right posterior appears to enhance  Prostamegaly     No signs of appendicitis or other inflammatory bowel findings.  Colonic diverticulosis present.  No obstructive findings.      EXAMINATION:  XR ABDOMEN AP 1 VIEW     CLINICAL HISTORY:  Other specified diseases of anus and rectum     TECHNIQUE:  Two view of the abdomen was performed.     COMPARISON:  None     FINDINGS:  Nonobstructive bowel gas pattern.  Moderate constipation.     No obvious free air.  No portal venous gas.     No acute fracture. Mild DJD. Lung bases are clear.  Contrast is seen within the urinary bladder.  Lung bases are clear.     Impression:     Moderate constipation.        Impression:   Eccentric urinary bladder wall thickening concerning for neoplasm for which direct visualization/cystoscopy recommended  Impression/Plan:   Rectal exam indicate an enlarged prostate.  Patient is very concerned about ED visit and possible rectal nodule, will not proceed with BCG at today's visit.  Return to clinic, tomorrow, to  be seen by Dr. Bailey.

## 2025-07-09 ENCOUNTER — OFFICE VISIT (OUTPATIENT)
Dept: UROLOGY | Facility: CLINIC | Age: 85
End: 2025-07-09
Payer: MEDICARE

## 2025-07-09 VITALS
HEIGHT: 70 IN | HEART RATE: 88 BPM | BODY MASS INDEX: 26.35 KG/M2 | DIASTOLIC BLOOD PRESSURE: 79 MMHG | SYSTOLIC BLOOD PRESSURE: 120 MMHG | WEIGHT: 184.06 LBS | RESPIRATION RATE: 14 BRPM

## 2025-07-09 DIAGNOSIS — C67.4 MALIGNANT NEOPLASM OF POSTERIOR WALL OF BLADDER: Primary | ICD-10-CM

## 2025-07-09 PROCEDURE — 99999 PR PBB SHADOW E&M-EST. PATIENT-LVL III: CPT | Mod: PBBFAC,,, | Performed by: UROLOGY

## 2025-07-09 NOTE — PROGRESS NOTES
.Patient in today for BCG treatment #2/6. One vial of BCG and 50 ml preservative free sodium chloride 0.9% mixed as per instructions. Using aseptic technique, a sterile fenestrated drape was place over perineum. Pt was catheterized using a #14Fr red rubber catheter to allow bladder emptying. Using closed system, one vial of BCG instilled via gravity directly into bladder. Pt tolerated well. Instructed to keep BCG solution in the bladder for 2 hours. Pt was given instructions to follow for the next 6 hours, including sitting on toilet at home and using 2 cups of undiluted chlorine bleach and closing the lid. Pt was told to allow bleach to stand for 15 minutes before flushing toilet. Pt was also told to drink plenty of water to flush any remaining BCG bacteria. Pt verbalized understanding.     Humaira Dumont LPN

## 2025-07-09 NOTE — PROGRESS NOTES
Chief Complaint:   Encounter Diagnosis   Name Primary?    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 Jha.  He was supposed to have had BCG yesterday but canceled due to an ER visit.  Following his 1st and only BCG treatment he noticed a lump on his anus, was concerned and went to the emergency department.  There CT scan demonstrated postoperative changes, reports of possible bladder dilation from the BCG causing the lump of his anus.  Of note no symptoms.    Allergies:  Penicillins    Medications:  See MAR    Review of Systems:  General: No fever, chills, fatigability, or weight loss.  Skin: No rashes, itching, or changes in color or texture of skin.  Chest: Denies ELLISON, cyanosis, wheezing, cough, and sputum production.  Abdomen: Appetite fine. No weight loss. Denies diarrhea, abdominal pain, hematemesis, or blood in stool.  Musculoskeletal: No joint stiffness or swelling. Denies back pain.  : As above.  All other review of systems negative.    PMH:   has a past medical history of Arthritis, Essential (primary) hypertension, Hypothyroidism, unspecified, Mixed hyperlipidemia, and Paroxysmal atrial fibrillation.    PSH:   has a past surgical history that includes Ankle surgery; cardiac loop monitor; turbt (transurethral resection of bladder tumor) (N/A, 5/29/2025); and Cystoscopy w/ retrogrades (Bilateral, 5/29/2025).    FamHx: family history is not on file.    SocHx:  reports that he has never smoked. He does not have any smokeless tobacco history on file. He reports current alcohol use of about 6.0 standard drinks of alcohol per week. He reports that he does not use drugs.      Physical Exam:  Vitals:    07/09/25 0923   BP: 120/79   Pulse: 88   Resp: 14     General: A&Ox3, no apparent distress, no deformities  Neck: No masses, normal ROM  Lungs: normal inspiration, no use of accessory muscles  Heart: normal pulse, no  arrhythmias  Abdomen: Soft, NT, ND, no masses, no hernias, no hepatosplenomegaly  Skin: The skin is warm and dry. No jaundice.  Ext: No c/c/e.  BARRETT: 7/9/25- external hemorrhoid identified on the right anal verge, no evidence of thrombosis, no pain upon palpation.  50 g prostate, no evidence of nodules or fixation, otherwise benign.    Labs/Studies:   UA reported in the chart.    Impression/Plan:     Bladder cancer- lesion identified has a hemorrhoid, currently asymptomatic and will monitor.  This is unrelated to his BCG treatment.  Patient is stable for BCG treatment and will go ahead and continue with his instillation today, he is currently 2/6 of his induction cycle.  Follow up as previously scheduled with his primary urologist Dr. Jha.  Call with any complaints prior to the next appointment.

## 2025-07-16 ENCOUNTER — OFFICE VISIT (OUTPATIENT)
Dept: UROLOGY | Facility: CLINIC | Age: 85
End: 2025-07-16
Payer: MEDICARE

## 2025-07-16 VITALS
TEMPERATURE: 98 F | RESPIRATION RATE: 14 BRPM | WEIGHT: 184.06 LBS | HEART RATE: 80 BPM | SYSTOLIC BLOOD PRESSURE: 120 MMHG | HEIGHT: 70 IN | DIASTOLIC BLOOD PRESSURE: 88 MMHG | BODY MASS INDEX: 26.35 KG/M2

## 2025-07-16 DIAGNOSIS — C67.4 MALIGNANT NEOPLASM OF POSTERIOR WALL OF BLADDER: ICD-10-CM

## 2025-07-16 DIAGNOSIS — C67.3 MALIGNANT NEOPLASM OF ANTERIOR WALL OF URINARY BLADDER: Primary | ICD-10-CM

## 2025-07-16 DIAGNOSIS — D49.4 BLADDER TUMOR: ICD-10-CM

## 2025-07-16 LAB
BILIRUBIN, UA POC OHS: NEGATIVE
BLOOD, UA POC OHS: ABNORMAL
CLARITY, UA POC OHS: CLEAR
COLOR, UA POC OHS: YELLOW
GLUCOSE, UA POC OHS: NEGATIVE
KETONES, UA POC OHS: NEGATIVE
LEUKOCYTES, UA POC OHS: NEGATIVE
NITRITE, UA POC OHS: NEGATIVE
PH, UA POC OHS: 5.5
PROTEIN, UA POC OHS: NEGATIVE
SPECIFIC GRAVITY, UA POC OHS: 1.02
UROBILINOGEN, UA POC OHS: 0.2

## 2025-07-16 PROCEDURE — 99999 PR PBB SHADOW E&M-EST. PATIENT-LVL III: CPT | Mod: PBBFAC,,, | Performed by: NURSE PRACTITIONER

## 2025-07-16 NOTE — PROGRESS NOTES
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  Denies gross hematuria or dysuria.  Leo NIEVES  7/9/25- 84-year-old gentleman who normally follows with Dr. Jha.  He was supposed to have had BCG yesterday but canceled due to an ER visit.  Following his 1st and only BCG treatment he noticed a lump on his anus, was concerned and went to the emergency department.  There CT scan demonstrated postoperative changes, reports of possible bladder dilation from the BCG causing the lump of his anus.  Of note no symptoms.     Allergies:  Penicillins     Medications:  See MAR     Review of Systems:  General: No fever, chills, fatigability, or weight loss.  Skin: No rashes, itching, or changes in color or texture of skin.  Chest: Denies ELLISON, cyanosis, wheezing, cough, and sputum production.  Abdomen: Appetite fine. No weight loss. Denies diarrhea, abdominal pain, hematemesis, or blood in stool.  Musculoskeletal: No joint stiffness or swelling. Denies back pain.  : As above.  All other review of systems negative.     PMH:   has a past medical history of Arthritis, Essential (primary) hypertension, Hypothyroidism, unspecified, Mixed hyperlipidemia, and Paroxysmal atrial fibrillation.     PSH:   has a past surgical history that includes Ankle surgery; cardiac loop monitor; turbt (transurethral resection of bladder tumor) (N/A, 5/29/2025); and Cystoscopy w/ retrogrades (Bilateral, 5/29/2025).     FamHx: family history is not on file.     SocHx:  reports that he has never smoked. He does not have any smokeless tobacco history on file. He reports current alcohol use of about 6.0 standard drinks of alcohol per week. He reports that he does not use drugs.       Physical Exam:      Vitals:     07/09/25 0923   BP: 120/79   Pulse: 88   Resp: 14      General: A&Ox3, no apparent distress, no deformities  Neck: No masses, normal ROM  Lungs: normal inspiration, no use  of accessory muscles  Heart: normal pulse, no arrhythmias  Abdomen: Soft, NT, ND, no masses, no hernias, no hepatosplenomegaly  Skin: The skin is warm and dry. No jaundice.  Ext: No c/c/e.  BARRETT: Leo NIEVES 7/9/25- external hemorrhoid identified on the right anal verge, no evidence of thrombosis, no pain upon palpation.  50 g prostate, no evidence of nodules or fixation, otherwise benign.     Labs/Studies:   UA reported in the chart.     Impression/Plan:   Bladder cancer-  Patient is stable for BCG treatment and will go ahead and continue with his instillation today, he is currently 3/6 of his induction cycle.  Follow up as previously scheduled with his primary urologist Dr. Jha.  Call with any complaints prior to the next appointment

## 2025-07-16 NOTE — PROGRESS NOTES
.....Patient in today for BCG treatment  #3/6.  One vial of BCG and 50ml perservative free sodium chloride 0.9% mixed as per instructions.  Using aseptic technique, a sterile fenestrated drape was placed over penis and perineal area cleansed with betadine.  Pt was catheterized using a #14Fr red rubber catheter to allow bladder emptying.  Using closed system, one vial BCG instilled via gravity directly into bladder.  Pt tolerated well.  Instructed pt to keep BCG solution in bladder for 2 hours.  Pt was given instructions to follow for the next 6 hours, including sitting on his toilet at home and using 2 cups of undiluted chlorine bleach and closing the lid.  Pt was told to allow bleach to stand for 15 minutes before flushing toilet.  He was also told to drink plenty of water to flush any remaining BCG bacteria.  Patient verbalized understanding.

## 2025-07-23 ENCOUNTER — OFFICE VISIT (OUTPATIENT)
Dept: UROLOGY | Facility: CLINIC | Age: 85
End: 2025-07-23
Payer: MEDICARE

## 2025-07-23 VITALS
DIASTOLIC BLOOD PRESSURE: 88 MMHG | BODY MASS INDEX: 26.35 KG/M2 | HEART RATE: 87 BPM | HEIGHT: 70 IN | TEMPERATURE: 99 F | WEIGHT: 184.06 LBS | RESPIRATION RATE: 16 BRPM | SYSTOLIC BLOOD PRESSURE: 135 MMHG

## 2025-07-23 DIAGNOSIS — C67.4 MALIGNANT NEOPLASM OF POSTERIOR WALL OF BLADDER: Primary | ICD-10-CM

## 2025-07-23 LAB
BILIRUB SERPL-MCNC: NEGATIVE MG/DL
BILIRUBIN, UA POC OHS: NEGATIVE
BLOOD URINE, POC: NORMAL
BLOOD, UA POC OHS: ABNORMAL
CLARITY, POC UA: CLEAR
CLARITY, UA POC OHS: CLEAR
COLOR, POC UA: YELLOW
COLOR, UA POC OHS: YELLOW
GLUCOSE UR QL STRIP: 500
GLUCOSE, UA POC OHS: 500
KETONES UR QL STRIP: NEGATIVE
KETONES, UA POC OHS: NEGATIVE
LEUKOCYTE ESTERASE URINE, POC: NEGATIVE
LEUKOCYTES, UA POC OHS: NEGATIVE
NITRITE, POC UA: NEGATIVE
NITRITE, UA POC OHS: NEGATIVE
PH, POC UA: 5
PH, UA POC OHS: 5
PROTEIN, POC: NEGATIVE
PROTEIN, UA POC OHS: NEGATIVE
SPECIFIC GRAVITY, POC UA: 1.01
SPECIFIC GRAVITY, UA POC OHS: 1.01
UROBILINOGEN, POC UA: 0.2
UROBILINOGEN, UA POC OHS: 0.2

## 2025-07-23 PROCEDURE — 99999 PR PBB SHADOW E&M-EST. PATIENT-LVL III: CPT | Mod: PBBFAC,,, | Performed by: NURSE PRACTITIONER

## 2025-07-23 NOTE — PROGRESS NOTES
.Patient in today for BCG treatment #4/6. One vial of BCG and 50 ml preservative free sodium chloride 0.9% mixed as per instructions. Using aseptic technique, a sterile fenestrated drape was place over perineum. Pt was catheterized using a #14Fr red rubber catheter to allow bladder emptying. Using closed system, one vial of BCG instilled via gravity directly into bladder. Pt tolerated well. Instructed to keep BCG solution in the bladder for 2 hours. Pt was given instructions to follow for the next 6 hours, including sitting on toilet at home and using 2 cups of undiluted chlorine bleach and closing the lid. Pt was told to allow bleach to stand for 15 minutes before flushing toilet. Pt was also told to drink plenty of water to flush any remaining BCG bacteria. Pt verbalized understanding.     AAMIR Dumont LPN

## 2025-07-23 NOTE — PROGRESS NOTES
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  Denies gross hematuria or dysuria.  Leo NIEVES  7/9/25- 84-year-old gentleman who normally follows with Dr. Jha.  He was supposed to have had BCG yesterday but canceled due to an ER visit.  Following his 1st and only BCG treatment he noticed a lump on his anus, was concerned and went to the emergency department.  There CT scan demonstrated postoperative changes, reports of possible bladder dilation from the BCG causing the lump of his anus.  Of note no symptoms.     Allergies:  Penicillins     Medications:  See MAR     Review of Systems:  General: No fever, chills, fatigability, or weight loss.  Skin: No rashes, itching, or changes in color or texture of skin.  Chest: Denies ELLISON, cyanosis, wheezing, cough, and sputum production.  Abdomen: Appetite fine. No weight loss. Denies diarrhea, abdominal pain, hematemesis, or blood in stool.  Musculoskeletal: No joint stiffness or swelling. Denies back pain.  : As above.  All other review of systems negative.     PMH:   has a past medical history of Arthritis, Essential (primary) hypertension, Hypothyroidism, unspecified, Mixed hyperlipidemia, and Paroxysmal atrial fibrillation.     PSH:   has a past surgical history that includes Ankle surgery; cardiac loop monitor; turbt (transurethral resection of bladder tumor) (N/A, 5/29/2025); and Cystoscopy w/ retrogrades (Bilateral, 5/29/2025).     FamHx: family history is not on file.     SocHx:  reports that he has never smoked. He does not have any smokeless tobacco history on file. He reports current alcohol use of about 6.0 standard drinks of alcohol per week. He reports that he does not use drugs.       Physical Exam:        Vitals:     07/23/2025   BP: 110/80   Pulse: 80   Resp: 18      General: A&Ox3, no apparent distress, no deformities  Neck: No masses, normal ROM  Lungs: normal inspiration, no use  of accessory muscles  Heart: normal pulse, no arrhythmias  Abdomen: Soft, NT, ND, no masses, no hernias, no hepatosplenomegaly  Skin: The skin is warm and dry. No jaundice.  Ext: No c/c/e.  BARRETT: Leo NIEVES 7/9/25- external hemorrhoid identified on the right anal verge, no evidence of thrombosis, no pain upon palpation.  50 g prostate, no evidence of nodules or fixation, otherwise benign.     Labs/Studies:   UA reported in the chart.     Impression/Plan:   Bladder cancer-  Patient is stable for BCG treatment and will go ahead and continue with his instillation today, he is currently 4/6 of his induction cycle.  Follow up as previously scheduled with his primary urologist Dr. Jha.  Call with any complaints prior to the next appointment

## 2025-07-30 ENCOUNTER — OFFICE VISIT (OUTPATIENT)
Dept: UROLOGY | Facility: CLINIC | Age: 85
End: 2025-07-30
Payer: MEDICARE

## 2025-07-30 DIAGNOSIS — C67.3 MALIGNANT NEOPLASM OF ANTERIOR WALL OF URINARY BLADDER: Primary | ICD-10-CM

## 2025-07-30 LAB
BILIRUBIN, UA POC OHS: NEGATIVE
BLOOD, UA POC OHS: NEGATIVE
CLARITY, UA POC OHS: CLEAR
COLOR, UA POC OHS: YELLOW
GLUCOSE, UA POC OHS: >=1000
KETONES, UA POC OHS: NEGATIVE
LEUKOCYTES, UA POC OHS: NEGATIVE
NITRITE, UA POC OHS: NEGATIVE
PH, UA POC OHS: 5.5
POC RESIDUAL URINE VOLUME: 24 ML (ref 0–100)
PROTEIN, UA POC OHS: NEGATIVE
SPECIFIC GRAVITY, UA POC OHS: 1.01
UROBILINOGEN, UA POC OHS: 0.2

## 2025-07-30 PROCEDURE — 99999 PR PBB SHADOW E&M-EST. PATIENT-LVL II: CPT | Mod: PBBFAC,,, | Performed by: NURSE PRACTITIONER

## 2025-07-30 NOTE — PROGRESS NOTES
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  Denies gross hematuria or dysuria.Urine in clinic indicated small blood and glucose.  Leo NIEVES  7/9/25- 84-year-old gentleman who normally follows with Dr. Jha.  He was supposed to have had BCG yesterday but canceled due to an ER visit.  Following his 1st and only BCG treatment he noticed a lump on his anus, was concerned and went to the emergency department.  There CT scan demonstrated postoperative changes, reports of possible bladder dilation from the BCG causing the lump of his anus.  Of note no symptoms.     Allergies:  Penicillins     Medications:  See MAR     Review of Systems:  General: No fever, chills, fatigability, or weight loss.  Skin: No rashes, itching, or changes in color or texture of skin.  Chest: Denies ELLISON, cyanosis, wheezing, cough, and sputum production.  Abdomen: Appetite fine. No weight loss. Denies diarrhea, abdominal pain, hematemesis, or blood in stool.  Musculoskeletal: No joint stiffness or swelling. Denies back pain.  : As above.  All other review of systems negative.     PMH:   has a past medical history of Arthritis, Essential (primary) hypertension, Hypothyroidism, unspecified, Mixed hyperlipidemia, and Paroxysmal atrial fibrillation.     PSH:   has a past surgical history that includes Ankle surgery; cardiac loop monitor; turbt (transurethral resection of bladder tumor) (N/A, 5/29/2025); and Cystoscopy w/ retrogrades (Bilateral, 5/29/2025).     FamHx: family history is not on file.     SocHx:  reports that he has never smoked. He does not have any smokeless tobacco history on file. He reports current alcohol use of about 6.0 standard drinks of alcohol per week. He reports that he does not use drugs.       Physical Exam:        Vitals:     0730/2025   BP: 118/84   Pulse: 72   Resp: 16      General: A&Ox3, no apparent distress, no deformities  Neck: No masses,  normal ROM  Lungs: normal inspiration, no use of accessory muscles  Heart: normal pulse, no arrhythmias  Abdomen: Soft, NT, ND, no masses, no hernias, no hepatosplenomegaly  Skin: The skin is warm and dry. No jaundice.  Ext: No c/c/e.  BARRETT: Leo NIEVES 7/9/25- external hemorrhoid identified on the right anal verge, no evidence of thrombosis, no pain upon palpation.  50 g prostate, no evidence of nodules or fixation, otherwise benign.     Labs/Studies:   UA reported in the chart.     Impression/Plan:   Bladder cancer-  Patient is stable for BCG treatment and will go ahead and continue with his instillation today, he is currently 5/6 of his induction cycle.  Follow up as previously scheduled with his primary urologist Dr. Jha.  Call with any complaints prior to the next appointment

## 2025-07-30 NOTE — PROGRESS NOTES
.Patient in today for BCG treatment #1/6. One vial of BCG and 50 ml preservative free sodium chloride 0.9% mixed as per instructions. Using aseptic technique, a sterile fenestrated drape was place over perineum. Pt was catheterized using a #14Fr red rubber catheter to allow bladder emptying. Using closed system, one vial of BCG instilled via gravity directly into bladder. Pt tolerated well. Instructed to keep BCG solution in the bladder for 2 hours. Pt was given instructions to follow for the next 6 hours, including sitting on toilet at home and using 2 cups of undiluted chlorine bleach and closing the lid. Pt was told to allow bleach to stand for 15 minutes before flushing toilet. Pt was also told to drink plenty of water to flush any remaining BCG bacteria. Pt verbalized understanding.     AAMIR Dumont LPN

## 2025-08-11 ENCOUNTER — OFFICE VISIT (OUTPATIENT)
Dept: UROLOGY | Facility: CLINIC | Age: 85
End: 2025-08-11
Payer: MEDICARE

## 2025-08-11 VITALS
SYSTOLIC BLOOD PRESSURE: 114 MMHG | HEART RATE: 86 BPM | BODY MASS INDEX: 26.35 KG/M2 | DIASTOLIC BLOOD PRESSURE: 78 MMHG | WEIGHT: 184.06 LBS | HEIGHT: 70 IN

## 2025-08-11 DIAGNOSIS — C67.4 MALIGNANT NEOPLASM OF POSTERIOR WALL OF URINARY BLADDER: Primary | ICD-10-CM

## 2025-08-11 PROCEDURE — 99999 PR PBB SHADOW E&M-EST. PATIENT-LVL III: CPT | Mod: PBBFAC,,, | Performed by: NURSE PRACTITIONER

## 2025-08-14 ENCOUNTER — TELEPHONE (OUTPATIENT)
Dept: UROLOGY | Facility: CLINIC | Age: 85
End: 2025-08-14
Payer: MEDICARE

## (undated) DEVICE — SYR 50ML CATH TIP

## (undated) DEVICE — SPONGE COTTON TRAY 4X4IN

## (undated) DEVICE — TRAY SKIN SCRUB WET 4 COMPART

## (undated) DEVICE — COVER LIGHT HANDLE 80/CA

## (undated) DEVICE — Device

## (undated) DEVICE — UROVIEW 2600/2800

## (undated) DEVICE — TOWEL OR DISP STRL BLUE 4/PK

## (undated) DEVICE — SYR ONLY LUER LOCK 20CC

## (undated) DEVICE — BOWL STERILE LARGE 32OZ

## (undated) DEVICE — GOWN POLY REINF X-LONG XL

## (undated) DEVICE — CATH POLLACK OPEN-END FLEXI-TI

## (undated) DEVICE — GOWN POLY REINF BRTH SLV XL

## (undated) DEVICE — SOL IRRI STRL WATER 1000ML

## (undated) DEVICE — CANISTER SUCTION JUMBO 12L

## (undated) DEVICE — GLOVE SIGNATURE ESSNTL LTX 6

## (undated) DEVICE — DRAPE T CYSTOSCOPY STERILE

## (undated) DEVICE — ELECTRODE REM PLYHSV RETURN 9

## (undated) DEVICE — BAG DRAIN ANTI REFLUX 4000ML

## (undated) DEVICE — SYR 10CC LUER LOCK

## (undated) DEVICE — ELECTRODE LOOP CUTTING BIPOLAR

## (undated) DEVICE — CONTAINER SPECIMEN OR STER 4OZ

## (undated) DEVICE — IRRIGATION SET Y-TYPE TUR/BLAD

## (undated) DEVICE — MARKER SKIN RULER STERILE

## (undated) DEVICE — SOL NACL IRR 3000ML